# Patient Record
Sex: FEMALE | Race: WHITE | NOT HISPANIC OR LATINO
[De-identification: names, ages, dates, MRNs, and addresses within clinical notes are randomized per-mention and may not be internally consistent; named-entity substitution may affect disease eponyms.]

---

## 2017-04-05 ENCOUNTER — TRANSCRIPTION ENCOUNTER (OUTPATIENT)
Age: 52
End: 2017-04-05

## 2019-06-06 PROBLEM — Z00.00 ENCOUNTER FOR PREVENTIVE HEALTH EXAMINATION: Status: ACTIVE | Noted: 2019-06-06

## 2019-06-19 ENCOUNTER — APPOINTMENT (OUTPATIENT)
Dept: ORTHOPEDIC SURGERY | Facility: CLINIC | Age: 54
End: 2019-06-19
Payer: MEDICAID

## 2019-06-19 VITALS
DIASTOLIC BLOOD PRESSURE: 75 MMHG | HEIGHT: 68 IN | BODY MASS INDEX: 31.07 KG/M2 | WEIGHT: 205 LBS | SYSTOLIC BLOOD PRESSURE: 121 MMHG | HEART RATE: 56 BPM

## 2019-06-19 DIAGNOSIS — M21.6X2 OTHER ACQUIRED DEFORMITIES OF LEFT FOOT: ICD-10-CM

## 2019-06-19 DIAGNOSIS — M25.872 OTHER SPECIFIED JOINT DISORDERS, LEFT ANKLE AND FOOT: ICD-10-CM

## 2019-06-19 DIAGNOSIS — M79.672 PAIN IN LEFT FOOT: ICD-10-CM

## 2019-06-19 DIAGNOSIS — M21.42 FLAT FOOT [PES PLANUS] (ACQUIRED), LEFT FOOT: ICD-10-CM

## 2019-06-19 PROCEDURE — 73630 X-RAY EXAM OF FOOT: CPT | Mod: LT

## 2019-06-19 PROCEDURE — 99203 OFFICE O/P NEW LOW 30 MIN: CPT

## 2019-06-23 PROBLEM — M21.6X2 GASTROCNEMIUS EQUINUS OF LEFT LOWER EXTREMITY: Status: ACTIVE | Noted: 2019-06-23

## 2019-06-23 PROBLEM — M25.872 SUBFIBULAR IMPINGEMENT OF LEFT LOWER EXTREMITY: Status: ACTIVE | Noted: 2019-06-23

## 2019-06-25 ENCOUNTER — APPOINTMENT (OUTPATIENT)
Dept: SURGERY | Facility: CLINIC | Age: 54
End: 2019-06-25
Payer: MEDICAID

## 2019-06-25 VITALS
RESPIRATION RATE: 16 BRPM | OXYGEN SATURATION: 97 % | HEART RATE: 56 BPM | HEIGHT: 68 IN | TEMPERATURE: 98.5 F | DIASTOLIC BLOOD PRESSURE: 80 MMHG | SYSTOLIC BLOOD PRESSURE: 126 MMHG | WEIGHT: 205 LBS | BODY MASS INDEX: 31.07 KG/M2

## 2019-06-25 DIAGNOSIS — Z93.3 COLOSTOMY STATUS: ICD-10-CM

## 2019-06-25 DIAGNOSIS — Z82.49 FAMILY HISTORY OF ISCHEMIC HEART DISEASE AND OTHER DISEASES OF THE CIRCULATORY SYSTEM: ICD-10-CM

## 2019-06-25 DIAGNOSIS — Z87.19 PERSONAL HISTORY OF OTHER DISEASES OF THE DIGESTIVE SYSTEM: ICD-10-CM

## 2019-06-25 DIAGNOSIS — Z82.3 FAMILY HISTORY OF STROKE: ICD-10-CM

## 2019-06-25 DIAGNOSIS — Z78.9 OTHER SPECIFIED HEALTH STATUS: ICD-10-CM

## 2019-06-25 DIAGNOSIS — Z80.0 FAMILY HISTORY OF MALIGNANT NEOPLASM OF DIGESTIVE ORGANS: ICD-10-CM

## 2019-06-25 DIAGNOSIS — Z82.61 FAMILY HISTORY OF ARTHRITIS: ICD-10-CM

## 2019-06-25 PROCEDURE — 99204 OFFICE O/P NEW MOD 45 MIN: CPT

## 2019-06-25 RX ORDER — MULTIVITAMIN
TABLET ORAL
Refills: 0 | Status: ACTIVE | COMMUNITY

## 2019-06-25 RX ORDER — MULTIVIT-MIN/IRON/FOLIC ACID/K 18-600-40
CAPSULE ORAL
Refills: 0 | Status: ACTIVE | COMMUNITY

## 2019-06-25 RX ORDER — ZINC SULFATE 50(220)MG
CAPSULE ORAL
Refills: 0 | Status: ACTIVE | COMMUNITY

## 2019-06-25 RX ORDER — CETIRIZINE HCL 10 MG
TABLET ORAL
Refills: 0 | Status: ACTIVE | COMMUNITY

## 2019-06-25 RX ORDER — PNV NO.95/FERROUS FUM/FOLIC AC 28MG-0.8MG
TABLET ORAL
Refills: 0 | Status: ACTIVE | COMMUNITY

## 2019-06-25 RX ORDER — OMEGA-3/DHA/EPA/FISH OIL 300-1000MG
CAPSULE ORAL
Refills: 0 | Status: ACTIVE | COMMUNITY

## 2019-06-25 NOTE — ASSESSMENT
[FreeTextEntry1] : In summary the patient has a symptomatic lower midline multi-fenestrated reducible incisional hernia containing bowel. I recommended that this be electively repaired via a retrorectus approach. I ordered a CT of the abdomen and pelvis to better define her abdominal wall anatomy. I also will be obtaining her operative note pathology reports and her colonoscopy report. I explained the risks benefits and alternatives of surgery including the risks of bleeding infection and the likely need for mesh to repair her hernia

## 2019-06-25 NOTE — PHYSICAL EXAM
[Normal Breath Sounds] : Normal breath sounds [Normal Rate and Rhythm] : normal rate and rhythm [No HSM] : no hepatosplenomegaly [No Rash or Lesion] : No rash or lesion [Alert] : alert [Calm] : calm [de-identified] : Soft, nontender, multi-fenestrated reducible minimally tender lower midline incisional hernia containing bowel [de-identified] : nad [de-identified] : nl

## 2019-06-25 NOTE — HISTORY OF PRESENT ILLNESS
[de-identified] : The patient is a 54-year-old woman who underwent a Rani procedure in  for sigmoid diverticulitis. She underwent Rani reversal later that year. Her only other abdominal surgical history was a  section. Several years ago she noticed a lump at the lower aspect of her previous midline incision. This has slowly increased in size. She gets occasional discomfort in the area. She has lost 75 pounds over the past year. She states that she had a colonoscopy several years ago and was told that she had "ulcerative colitis". She did not followup with her gastroenterologist and takes no medications for ulcerative colitis. In the office today she feels well. She has mild discomfort in her lower abdomen. She denies nausea vomiting and moves her bowels daily.

## 2019-06-25 NOTE — CONSULT LETTER
[Dear  ___] : Dear  [unfilled], [Please see my note below.] : Please see my note below. [Consult Letter:] : I had the pleasure of evaluating your patient, [unfilled]. [Consult Closing:] : Thank you very much for allowing me to participate in the care of this patient.  If you have any questions, please do not hesitate to contact me. [Sincerely,] : Sincerely, [FreeTextEntry3] : Carlos Lucas M.D., F.A.C.S, F.A.S.C.R.S

## 2019-06-27 ENCOUNTER — TRANSCRIPTION ENCOUNTER (OUTPATIENT)
Age: 54
End: 2019-06-27

## 2019-07-07 PROBLEM — M21.42 ACQUIRED LEFT FLAT FOOT: Status: ACTIVE | Noted: 2019-06-23

## 2019-07-18 ENCOUNTER — CHART COPY (OUTPATIENT)
Age: 54
End: 2019-07-18

## 2019-07-31 ENCOUNTER — APPOINTMENT (OUTPATIENT)
Dept: ORTHOPEDIC SURGERY | Facility: CLINIC | Age: 54
End: 2019-07-31

## 2019-09-19 ENCOUNTER — OUTPATIENT (OUTPATIENT)
Dept: OUTPATIENT SERVICES | Facility: HOSPITAL | Age: 54
LOS: 1 days | End: 2019-09-19
Payer: MEDICAID

## 2019-09-19 VITALS
DIASTOLIC BLOOD PRESSURE: 68 MMHG | WEIGHT: 203.05 LBS | HEIGHT: 67 IN | SYSTOLIC BLOOD PRESSURE: 102 MMHG | HEART RATE: 61 BPM | RESPIRATION RATE: 16 BRPM | TEMPERATURE: 99 F | OXYGEN SATURATION: 99 %

## 2019-09-19 DIAGNOSIS — Z98.891 HISTORY OF UTERINE SCAR FROM PREVIOUS SURGERY: Chronic | ICD-10-CM

## 2019-09-19 DIAGNOSIS — K43.2 INCISIONAL HERNIA WITHOUT OBSTRUCTION OR GANGRENE: ICD-10-CM

## 2019-09-19 DIAGNOSIS — Z01.818 ENCOUNTER FOR OTHER PREPROCEDURAL EXAMINATION: ICD-10-CM

## 2019-09-19 DIAGNOSIS — Z29.9 ENCOUNTER FOR PROPHYLACTIC MEASURES, UNSPECIFIED: ICD-10-CM

## 2019-09-19 DIAGNOSIS — Z90.49 ACQUIRED ABSENCE OF OTHER SPECIFIED PARTS OF DIGESTIVE TRACT: Chronic | ICD-10-CM

## 2019-09-19 DIAGNOSIS — Z98.890 OTHER SPECIFIED POSTPROCEDURAL STATES: Chronic | ICD-10-CM

## 2019-09-19 DIAGNOSIS — Z90.89 ACQUIRED ABSENCE OF OTHER ORGANS: Chronic | ICD-10-CM

## 2019-09-19 LAB
ANION GAP SERPL CALC-SCNC: 10 MMOL/L — SIGNIFICANT CHANGE UP (ref 5–17)
BUN SERPL-MCNC: 19 MG/DL — SIGNIFICANT CHANGE UP (ref 7–23)
CALCIUM SERPL-MCNC: 10.2 MG/DL — SIGNIFICANT CHANGE UP (ref 8.4–10.5)
CHLORIDE SERPL-SCNC: 102 MMOL/L — SIGNIFICANT CHANGE UP (ref 96–108)
CO2 SERPL-SCNC: 29 MMOL/L — SIGNIFICANT CHANGE UP (ref 22–31)
CREAT SERPL-MCNC: 0.94 MG/DL — SIGNIFICANT CHANGE UP (ref 0.5–1.3)
GLUCOSE SERPL-MCNC: 87 MG/DL — SIGNIFICANT CHANGE UP (ref 70–99)
HCT VFR BLD CALC: 40.7 % — SIGNIFICANT CHANGE UP (ref 34.5–45)
HGB BLD-MCNC: 12.6 G/DL — SIGNIFICANT CHANGE UP (ref 11.5–15.5)
MCHC RBC-ENTMCNC: 28.1 PG — SIGNIFICANT CHANGE UP (ref 27–34)
MCHC RBC-ENTMCNC: 31 GM/DL — LOW (ref 32–36)
MCV RBC AUTO: 90.6 FL — SIGNIFICANT CHANGE UP (ref 80–100)
MRSA PCR RESULT.: SIGNIFICANT CHANGE UP
PLATELET # BLD AUTO: 260 K/UL — SIGNIFICANT CHANGE UP (ref 150–400)
POTASSIUM SERPL-MCNC: 4.5 MMOL/L — SIGNIFICANT CHANGE UP (ref 3.5–5.3)
POTASSIUM SERPL-SCNC: 4.5 MMOL/L — SIGNIFICANT CHANGE UP (ref 3.5–5.3)
RBC # BLD: 4.49 M/UL — SIGNIFICANT CHANGE UP (ref 3.8–5.2)
RBC # FLD: 14.5 % — SIGNIFICANT CHANGE UP (ref 10.3–14.5)
S AUREUS DNA NOSE QL NAA+PROBE: SIGNIFICANT CHANGE UP
SODIUM SERPL-SCNC: 141 MMOL/L — SIGNIFICANT CHANGE UP (ref 135–145)
WBC # BLD: 5.35 K/UL — SIGNIFICANT CHANGE UP (ref 3.8–10.5)
WBC # FLD AUTO: 5.35 K/UL — SIGNIFICANT CHANGE UP (ref 3.8–10.5)

## 2019-09-19 PROCEDURE — 85027 COMPLETE CBC AUTOMATED: CPT

## 2019-09-19 PROCEDURE — G0463: CPT

## 2019-09-19 PROCEDURE — 80048 BASIC METABOLIC PNL TOTAL CA: CPT

## 2019-09-19 PROCEDURE — 87641 MR-STAPH DNA AMP PROBE: CPT

## 2019-09-19 PROCEDURE — 87640 STAPH A DNA AMP PROBE: CPT

## 2019-09-19 NOTE — H&P PST ADULT - NSICDXPASTSURGICALHX_GEN_ALL_CORE_FT
PAST SURGICAL HISTORY:  H/O  section     History of colon resection 2006 - with creation of ostomy - for diverticulitis    History of colostomy reversal 2006    History of tonsillectomy

## 2019-09-19 NOTE — H&P PST ADULT - NSICDXPASTMEDICALHX_GEN_ALL_CORE_FT
PAST MEDICAL HISTORY:  Allergic bronchitis last episode 10 years ago    Diverticulitis     H/O ulcerative colitis     Incisional hernia

## 2019-09-19 NOTE — H&P PST ADULT - NSANTHOSAYNRD_GEN_A_CORE
No. ANT screening performed.  STOP BANG Legend: 0-2 = LOW Risk; 3-4 = INTERMEDIATE Risk; 5-8 = HIGH Risk

## 2019-09-19 NOTE — H&P PST ADULT - HISTORY OF PRESENT ILLNESS
53 yo female with h/o diverticulitis s/p colon resection and colostomy 2006 (with subsequent reversal of colostomy). Pt has developed large incisional hernia, with c/o occasional discomfort. She is scheduled for Incisional Hernia Repair on 9/30/2019.

## 2019-09-19 NOTE — H&P PST ADULT - NSICDXPROBLEM_GEN_ALL_CORE_FT
PROBLEM DIAGNOSES  Problem: Incisional hernia  Assessment and Plan: Incisional Hernia Repair  Nasal Swab sent at [facility]    Problem: Need for prophylactic measure  Assessment and Plan: The Caprini score indicates this patient is at risk for a VTE event (score 3-5).  Most surgical patients in this group would benefit from pharmacologic prophylaxis.  The surgical team will determine the balance between VTE risk and bleeding risk

## 2019-09-19 NOTE — H&P PST ADULT - ASSESSMENT
1.	[facility]  [facility] [[facility] updated 2019]    AGE RELATED RISK FACTORS                                                       MOBILITY RELATED FACTORS  [ x] Age 41-60 years                                            (1 Point)                    [ ] Bed rest                                                        (1 Point)  [ ] Age: 61-74 years                                           (2 Points)                  [ ] Plaster cast                                                   (2 Points)  [ ] Age= 75 years                                              (3 Points)                    [ ] Bed bound for more than 72 hours                 (2 Points)    DISEASE RELATED RISK FACTORS                                               GENDER SPECIFIC FACTORS  [ ] Edema in the lower extremities                       (1 Point)              [ ] Pregnancy                                                     (1 Point)  [ ] Varicose veins                                               (1 Point)                     [ ] Post-partum < 6 weeks                                   (1 Point)             [ x] BMI > 25 Kg/m2                                            (1 Point)                     [ ] Hormonal therapy  or oral contraception          (1 Point)                 [ ] Sepsis (in the previous month)                        (1 Point)               [ ] History of pregnancy complications                 (1 point)  [ ] Pneumonia or serious lung disease                                               [ ] Unexplained or recurrent                     (1 Point)           (in the previous month)                               (1 Point)  [ ] Abnormal pulmonary function test                     (1 Point)                 SURGERY RELATED RISK FACTORS  [ ] Acute myocardial infarction                              (1 Point)               [ ]  Section                                             (1 Point)  [ ] Congestive heart failure (in the previous month)  (1 Point)      [ ] Minor surgery                                                  (1 Point)   [ ] Inflammatory bowel disease                             (1 Point)               [ ] Arthroscopic surgery                                        (2 Points)  [ ] Central venous access                                      (2 Points)                [x ] General surgery lasting more than 45 minutes (2 points)  [ ] Malignancy- Present or previous                   (2 Points)                [ ] Elective arthroplasty                                         (5 points)    [ ] Stroke (in the previous month)                          (5 Points)                                                                                                                                                           HEMATOLOGY RELATED FACTORS                                                 TRAUMA RELATED RISK FACTORS  [ ] Prior episodes of VTE                                     (3 Points)                [ ] Fracture of the hip, pelvis, or leg                       (5 Points)  [ ] Positive family history for VTE                         (3 Points)             [ ] Acute spinal cord injury (in the previous month)  (5 Points)  [ ] Prothrombin 60350 A                                     (3 Points)               [ ] Paralysis  (less than 1 month)                             (5 Points)  [ ] Factor V Leiden                                             (3 Points)                  [ ] Multiple Trauma within 1 month                        (5 Points)  [ ] Lupus anticoagulants                                     (3 Points)                                                           [ ] Anticardiolipin antibodies                               (3 Points)                                                       [ ] High homocysteine in the blood                      (3 Points)                                             [ ] Other congenital or acquired thrombophilia      (3 Points)                                                [ ] Heparin induced thrombocytopenia                  (3 Points)                                     Total Score [     4     ]

## 2019-09-29 ENCOUNTER — TRANSCRIPTION ENCOUNTER (OUTPATIENT)
Age: 54
End: 2019-09-29

## 2019-09-29 NOTE — PRE-ANESTHESIA EVALUATION ADULT - NSANTHPMHFT_GEN_ALL_CORE
55 yo female with h/o diverticulitis s/p colon resection and colostomy 2006 (with subsequent reversal of colostomy). Pt has developed large incisional hernia, with c/o occasional discomfort. She is scheduled for Incisional Hernia Repair on 9/30/2019.

## 2019-09-30 ENCOUNTER — APPOINTMENT (OUTPATIENT)
Dept: SURGERY | Facility: HOSPITAL | Age: 54
End: 2019-09-30
Payer: MEDICAID

## 2019-09-30 ENCOUNTER — RESULT REVIEW (OUTPATIENT)
Age: 54
End: 2019-09-30

## 2019-09-30 ENCOUNTER — INPATIENT (INPATIENT)
Facility: HOSPITAL | Age: 54
LOS: 1 days | Discharge: HOME | DRG: 337 | End: 2019-10-02
Attending: COLON & RECTAL SURGERY | Admitting: COLON & RECTAL SURGERY
Payer: MEDICAID

## 2019-09-30 VITALS
TEMPERATURE: 98 F | RESPIRATION RATE: 16 BRPM | DIASTOLIC BLOOD PRESSURE: 73 MMHG | HEART RATE: 62 BPM | SYSTOLIC BLOOD PRESSURE: 106 MMHG | OXYGEN SATURATION: 99 % | HEIGHT: 67 IN | WEIGHT: 203.05 LBS

## 2019-09-30 DIAGNOSIS — Z98.890 OTHER SPECIFIED POSTPROCEDURAL STATES: Chronic | ICD-10-CM

## 2019-09-30 DIAGNOSIS — Z90.89 ACQUIRED ABSENCE OF OTHER ORGANS: Chronic | ICD-10-CM

## 2019-09-30 DIAGNOSIS — K43.2 INCISIONAL HERNIA WITHOUT OBSTRUCTION OR GANGRENE: ICD-10-CM

## 2019-09-30 DIAGNOSIS — Z98.891 HISTORY OF UTERINE SCAR FROM PREVIOUS SURGERY: Chronic | ICD-10-CM

## 2019-09-30 DIAGNOSIS — Z90.49 ACQUIRED ABSENCE OF OTHER SPECIFIED PARTS OF DIGESTIVE TRACT: Chronic | ICD-10-CM

## 2019-09-30 LAB
BLD GP AB SCN SERPL QL: NEGATIVE — SIGNIFICANT CHANGE UP
HCG UR QL: NEGATIVE — SIGNIFICANT CHANGE UP
RH IG SCN BLD-IMP: POSITIVE — SIGNIFICANT CHANGE UP

## 2019-09-30 PROCEDURE — 49568: CPT

## 2019-09-30 PROCEDURE — 15734 MUSCLE-SKIN GRAFT TRUNK: CPT | Mod: 59

## 2019-09-30 PROCEDURE — 49560: CPT

## 2019-09-30 PROCEDURE — 74018 RADEX ABDOMEN 1 VIEW: CPT | Mod: 26

## 2019-09-30 PROCEDURE — 88302 TISSUE EXAM BY PATHOLOGIST: CPT | Mod: 26

## 2019-09-30 RX ORDER — CHLORHEXIDINE GLUCONATE 213 G/1000ML
1 SOLUTION TOPICAL ONCE
Refills: 0 | Status: COMPLETED | OUTPATIENT
Start: 2019-09-30 | End: 2019-09-30

## 2019-09-30 RX ORDER — OXYCODONE HYDROCHLORIDE 5 MG/1
5 TABLET ORAL EVERY 4 HOURS
Refills: 0 | Status: DISCONTINUED | OUTPATIENT
Start: 2019-09-30 | End: 2019-10-02

## 2019-09-30 RX ORDER — SODIUM CHLORIDE 9 MG/ML
3 INJECTION INTRAMUSCULAR; INTRAVENOUS; SUBCUTANEOUS EVERY 8 HOURS
Refills: 0 | Status: DISCONTINUED | OUTPATIENT
Start: 2019-09-30 | End: 2019-09-30

## 2019-09-30 RX ORDER — OXYCODONE HYDROCHLORIDE 5 MG/1
10 TABLET ORAL EVERY 4 HOURS
Refills: 0 | Status: DISCONTINUED | OUTPATIENT
Start: 2019-09-30 | End: 2019-10-02

## 2019-09-30 RX ORDER — ACETAMINOPHEN 500 MG
975 TABLET ORAL EVERY 6 HOURS
Refills: 0 | Status: DISCONTINUED | OUTPATIENT
Start: 2019-09-30 | End: 2019-10-02

## 2019-09-30 RX ORDER — ONDANSETRON 8 MG/1
4 TABLET, FILM COATED ORAL ONCE
Refills: 0 | Status: DISCONTINUED | OUTPATIENT
Start: 2019-09-30 | End: 2019-09-30

## 2019-09-30 RX ORDER — PREGABALIN 225 MG/1
1000 CAPSULE ORAL DAILY
Refills: 0 | Status: DISCONTINUED | OUTPATIENT
Start: 2019-09-30 | End: 2019-10-02

## 2019-09-30 RX ORDER — ONDANSETRON 8 MG/1
4 TABLET, FILM COATED ORAL ONCE
Refills: 0 | Status: COMPLETED | OUTPATIENT
Start: 2019-09-30 | End: 2019-10-01

## 2019-09-30 RX ORDER — DIPHENHYDRAMINE HCL 50 MG
25 CAPSULE ORAL ONCE
Refills: 0 | Status: COMPLETED | OUTPATIENT
Start: 2019-09-30 | End: 2019-09-30

## 2019-09-30 RX ORDER — SODIUM CHLORIDE 9 MG/ML
1000 INJECTION, SOLUTION INTRAVENOUS
Refills: 0 | Status: DISCONTINUED | OUTPATIENT
Start: 2019-09-30 | End: 2019-10-01

## 2019-09-30 RX ORDER — LIDOCAINE HCL 20 MG/ML
0.2 VIAL (ML) INJECTION ONCE
Refills: 0 | Status: COMPLETED | OUTPATIENT
Start: 2019-09-30 | End: 2019-09-30

## 2019-09-30 RX ORDER — ASCORBIC ACID 60 MG
2000 TABLET,CHEWABLE ORAL DAILY
Refills: 0 | Status: DISCONTINUED | OUTPATIENT
Start: 2019-09-30 | End: 2019-10-02

## 2019-09-30 RX ORDER — CEFAZOLIN SODIUM 1 G
2000 VIAL (EA) INJECTION ONCE
Refills: 0 | Status: DISCONTINUED | OUTPATIENT
Start: 2019-09-30 | End: 2019-09-30

## 2019-09-30 RX ORDER — ENOXAPARIN SODIUM 100 MG/ML
40 INJECTION SUBCUTANEOUS EVERY 24 HOURS
Refills: 0 | Status: DISCONTINUED | OUTPATIENT
Start: 2019-09-30 | End: 2019-10-02

## 2019-09-30 RX ADMIN — Medication 1 TABLET(S): at 17:33

## 2019-09-30 RX ADMIN — SODIUM CHLORIDE 75 MILLILITER(S): 9 INJECTION, SOLUTION INTRAVENOUS at 13:14

## 2019-09-30 RX ADMIN — SODIUM CHLORIDE 75 MILLILITER(S): 9 INJECTION, SOLUTION INTRAVENOUS at 17:33

## 2019-09-30 RX ADMIN — Medication 25 MILLIGRAM(S): at 21:18

## 2019-09-30 RX ADMIN — Medication 0.2 MILLILITER(S): at 06:16

## 2019-09-30 RX ADMIN — ENOXAPARIN SODIUM 40 MILLIGRAM(S): 100 INJECTION SUBCUTANEOUS at 17:33

## 2019-09-30 RX ADMIN — CHLORHEXIDINE GLUCONATE 1 APPLICATION(S): 213 SOLUTION TOPICAL at 06:16

## 2019-09-30 RX ADMIN — Medication 975 MILLIGRAM(S): at 23:44

## 2019-09-30 RX ADMIN — Medication 975 MILLIGRAM(S): at 17:33

## 2019-09-30 RX ADMIN — Medication 2000 MILLIGRAM(S): at 17:33

## 2019-09-30 RX ADMIN — SODIUM CHLORIDE 3 MILLILITER(S): 9 INJECTION INTRAMUSCULAR; INTRAVENOUS; SUBCUTANEOUS at 06:16

## 2019-09-30 RX ADMIN — Medication 975 MILLIGRAM(S): at 18:00

## 2019-09-30 NOTE — BRIEF OPERATIVE NOTE - OPERATION/FINDINGS
Multiple fascial defects along previous incision and midline, closed with retrorectus mesh. Plastics consulted for skin closure, two krishna drains in subcutaneous space.

## 2019-09-30 NOTE — BRIEF OPERATIVE NOTE - NSICDXBRIEFPOSTOP_GEN_ALL_CORE_FT
POST-OP DIAGNOSIS:  Excess skin of abdomen 30-Sep-2019 12:40:08  Antonia Corea  Incisional hernia 30-Sep-2019 12:39:57  Antonia Corea

## 2019-09-30 NOTE — PRE-ANESTHESIA EVALUATION ADULT - NSANTHOSAYNRD_GEN_A_CORE
No. ANT screening performed.  STOP BANG Legend: 0-2 = LOW Risk; 3-4 = INTERMEDIATE Risk; 5-8 = HIGH Risk
No. ANT screening performed.  STOP BANG Legend: 0-2 = LOW Risk; 3-4 = INTERMEDIATE Risk; 5-8 = HIGH Risk

## 2019-09-30 NOTE — PRE-ANESTHESIA EVALUATION ADULT - ANESTHESIA, PREVIOUS REACTION, PROFILE
no personal reaction to anesthesia; mother had N/V with anesthesia/none
no personal reaction to anesthesia; mother had N/V with anesthesia/none

## 2019-09-30 NOTE — CHART NOTE - NSCHARTNOTEFT_GEN_A_CORE
Surgery Post op Note    Procedure: Incisional Hernia Repair with Mesh  POD #0  Surgeon: Dr. Lucas     SUBJECTIVE: Patient seen and evaluated at the bedside. Patient with daughter and other family members at the bedside. Doing well. Recently transferred from the PACU to the surgical floor. Pain well tolerated with medications. Prevena vac in place.   SOB:  [ ] YES [x ] NO  Chest Discomfort: [ ] YES [x ] NO    Nausea: [ ] YES [x ] NO           Vomiting: [ ] YES [x ] NO  Flatus: [ ] YES [x ] NO             Bowel Movement: [ ] YES [x ] NO  Void: [x ]YES [ ]No    Brizuela Catheter in place      Pain Control Adequate: [x ] YES [ ] NO    Objective:   Vital Signs Last 24 Hrs  T(C): 36.3 (30 Sep 2019 16:45), Max: 36.7 (30 Sep 2019 15:46)  T(F): 97.3 (30 Sep 2019 16:45), Max: 98.1 (30 Sep 2019 15:46)  HR: 61 (30 Sep 2019 16:45) (54 - 73)  BP: 93/60 (30 Sep 2019 16:45) (93/60 - 111/69)  BP(mean): 73 (30 Sep 2019 14:30) (70 - 85)  RR: 18 (30 Sep 2019 16:45) (11 - 18)  SpO2: 98% (30 Sep 2019 16:45) (96% - 99%)  I&O's Summary    30 Sep 2019 07:01  -  30 Sep 2019 16:57  --------------------------------------------------------  IN: 345 mL / OUT: 225 mL / NET: 120 mL      I&O's Detail    30 Sep 2019 07:01  -  30 Sep 2019 16:57  --------------------------------------------------------  IN:    lactated ringers.: 225 mL    Oral Fluid: 120 mL  Total IN: 345 mL    OUT:    Bulb: 20 mL    Bulb: 20 mL    Indwelling Catheter - Urethral: 185 mL  Total OUT: 225 mL    Total NET: 120 mL            LABS:                MEDICATIONS  (STANDING):  acetaminophen   Tablet .. 975 milliGRAM(s) Oral every 6 hours  ascorbic acid 2000 milliGRAM(s) Oral daily  cyanocobalamin 1000 MICROGram(s) Oral daily  enoxaparin Injectable 40 milliGRAM(s) SubCutaneous every 24 hours  lactated ringers. 1000 milliLiter(s) (75 mL/Hr) IV Continuous <Continuous>  multivitamin 1 Tablet(s) Oral daily    MEDICATIONS  (PRN):  oxyCODONE    IR 5 milliGRAM(s) Oral every 4 hours PRN Moderate Pain (4 - 6)  oxyCODONE    IR 10 milliGRAM(s) Oral every 4 hours PRN Severe Pain (7 - 10)      Physical exam  General  Abdomen     Assessment/Plan: 54y Female  s/p   - Diet:   - Activity- OOB with assistance  - Labs:   - Pain medication as needed   - DVT ppx  - incentive spirometry     Isabel Culver PA-C   p 90 Surgery Post op Note    Procedure: Incisional Hernia Repair with Mesh  POD #0  Surgeon: Dr. Lucas     SUBJECTIVE: Patient seen and evaluated at the bedside. Patient with daughter and other family members at the bedside. Doing well. Recently transferred from the PACU to the surgical floor. Pain well tolerated with medications. Prevena vac in place.   SOB:  [ ] YES [x ] NO  Chest Discomfort: [ ] YES [x ] NO    Nausea: [ ] YES [x ] NO           Vomiting: [ ] YES [x ] NO  Flatus: [ ] YES [x ] NO             Bowel Movement: [ ] YES [x ] NO  Void: [x ]YES [ ]No    Brizuela Catheter in place      Pain Control Adequate: [x ] YES [ ] NO    Objective:   Vital Signs Last 24 Hrs  T(C): 36.3 (30 Sep 2019 16:45), Max: 36.7 (30 Sep 2019 15:46)  T(F): 97.3 (30 Sep 2019 16:45), Max: 98.1 (30 Sep 2019 15:46)  HR: 61 (30 Sep 2019 16:45) (54 - 73)  BP: 93/60 (30 Sep 2019 16:45) (93/60 - 111/69)  BP(mean): 73 (30 Sep 2019 14:30) (70 - 85)  RR: 18 (30 Sep 2019 16:45) (11 - 18)  SpO2: 98% (30 Sep 2019 16:45) (96% - 99%)  I&O's Summary    30 Sep 2019 07:01  -  30 Sep 2019 16:57  --------------------------------------------------------  IN: 345 mL / OUT: 225 mL / NET: 120 mL      I&O's Detail    30 Sep 2019 07:01  -  30 Sep 2019 16:57  --------------------------------------------------------  IN:    lactated ringers.: 225 mL    Oral Fluid: 120 mL  Total IN: 345 mL    OUT:    Bulb: 20 mL    Bulb: 20 mL    Indwelling Catheter - Urethral: 185 mL  Total OUT: 225 mL    Total NET: 120 mL            LABS:                MEDICATIONS  (STANDING):  acetaminophen   Tablet .. 975 milliGRAM(s) Oral every 6 hours  ascorbic acid 2000 milliGRAM(s) Oral daily  cyanocobalamin 1000 MICROGram(s) Oral daily  enoxaparin Injectable 40 milliGRAM(s) SubCutaneous every 24 hours  lactated ringers. 1000 milliLiter(s) (75 mL/Hr) IV Continuous <Continuous>  multivitamin 1 Tablet(s) Oral daily    MEDICATIONS  (PRN):  oxyCODONE    IR 5 milliGRAM(s) Oral every 4 hours PRN Moderate Pain (4 - 6)  oxyCODONE    IR 10 milliGRAM(s) Oral every 4 hours PRN Severe Pain (7 - 10)      Physical exam  General: NAD, AAO x 3  Respiratory: non labored breathing on room, attached to continuous pulse ox spO2 97-99%  Abdomen: soft, appropriately tender to palpation near incision, nondistended     Assessment/Plan: 54y Female  s/p   - Diet:   - Activity- OOB with assistance  - Labs:   - Pain medication as needed   - DVT ppx  - incentive spirometry     Isabel Culver PA-C   p 90 Surgery Post op Note    Procedure: Incisional Hernia Repair with Mesh  POD #0  Surgeon: Dr. Lucas     SUBJECTIVE: Patient seen and evaluated at the bedside. Patient with daughter and other family members at the bedside. Doing well. Recently transferred from the PACU to the surgical floor. Pain well tolerated with medications. Prevena vac in place. Pulling [phone] on IS.   SOB:  [ ] YES [x ] NO  Chest Discomfort: [ ] YES [x ] NO    Nausea: [ ] YES [x ] NO           Vomiting: [ ] YES [x ] NO  Flatus: [ ] YES [x ] NO             Bowel Movement: [ ] YES [x ] NO  Void: [x ]YES [ ]No    Rodriguez Catheter in place      Pain Control Adequate: [x ] YES [ ] NO    Objective:   Vital Signs Last 24 Hrs  T(C): 36.3 (30 Sep 2019 16:45), Max: 36.7 (30 Sep 2019 15:46)  T(F): 97.3 (30 Sep 2019 16:45), Max: 98.1 (30 Sep 2019 15:46)  HR: 61 (30 Sep 2019 16:45) (54 - 73)  BP: 93/60 (30 Sep 2019 16:45) (93/60 - 111/69)  BP(mean): 73 (30 Sep 2019 14:30) (70 - 85)  RR: 18 (30 Sep 2019 16:45) (11 - 18)  SpO2: 98% (30 Sep 2019 16:45) (96% - 99%)  I&O's Summary    30 Sep 2019 07:01  -  30 Sep 2019 16:57  --------------------------------------------------------  IN: 345 mL / OUT: 225 mL / NET: 120 mL      I&O's Detail    30 Sep 2019 07:01  -  30 Sep 2019 16:57  --------------------------------------------------------  IN:    lactated ringers.: 225 mL    Oral Fluid: 120 mL  Total IN: 345 mL    OUT:    Bulb: 20 mL    Bulb: 20 mL    Indwelling Catheter - Urethral: 185 mL  Total OUT: 225 mL    Total NET: 120 mL            LABS:                MEDICATIONS  (STANDING):  acetaminophen   Tablet .. 975 milliGRAM(s) Oral every 6 hours  ascorbic acid 2000 milliGRAM(s) Oral daily  cyanocobalamin 1000 MICROGram(s) Oral daily  enoxaparin Injectable 40 milliGRAM(s) SubCutaneous every 24 hours  lactated ringers. 1000 milliLiter(s) (75 mL/Hr) IV Continuous <Continuous>  multivitamin 1 Tablet(s) Oral daily    MEDICATIONS  (PRN):  oxyCODONE    IR 5 milliGRAM(s) Oral every 4 hours PRN Moderate Pain (4 - 6)  oxyCODONE    IR 10 milliGRAM(s) Oral every 4 hours PRN Severe Pain (7 - 10)      Physical exam  General: NAD, AAO x 3  Respiratory: non labored breathing on room, attached to continuous pulse ox spO2 97-99%  Abdomen: soft, appropriately tender to palpation near incision, nondistended     Assessment/Plan: 54y Female with a h/o diverticulitis s/p colon resection and colostomy in 2006 with subsequent reversal of colostomy now PODS#0 from an incisional hernia repair with mesh.   - Diet: Regular diet   - Activity- OOB as tolerated  - Labs: AM labs  - Pain medication as needed   - DVT ppx  - incentive spirometry   - c/w rodriguez catheter     Isabel Culver PA-C   p [id number]

## 2019-10-01 LAB
ANION GAP SERPL CALC-SCNC: 8 MMOL/L — SIGNIFICANT CHANGE UP (ref 5–17)
BUN SERPL-MCNC: 13 MG/DL — SIGNIFICANT CHANGE UP (ref 7–23)
CALCIUM SERPL-MCNC: 9 MG/DL — SIGNIFICANT CHANGE UP (ref 8.4–10.5)
CHLORIDE SERPL-SCNC: 102 MMOL/L — SIGNIFICANT CHANGE UP (ref 96–108)
CO2 SERPL-SCNC: 28 MMOL/L — SIGNIFICANT CHANGE UP (ref 22–31)
CREAT SERPL-MCNC: 0.81 MG/DL — SIGNIFICANT CHANGE UP (ref 0.5–1.3)
GLUCOSE SERPL-MCNC: 103 MG/DL — HIGH (ref 70–99)
HCT VFR BLD CALC: 36.9 % — SIGNIFICANT CHANGE UP (ref 34.5–45)
HGB BLD-MCNC: 11.2 G/DL — LOW (ref 11.5–15.5)
MAGNESIUM SERPL-MCNC: 2.2 MG/DL — SIGNIFICANT CHANGE UP (ref 1.6–2.6)
MCHC RBC-ENTMCNC: 27.7 PG — SIGNIFICANT CHANGE UP (ref 27–34)
MCHC RBC-ENTMCNC: 30.4 GM/DL — LOW (ref 32–36)
MCV RBC AUTO: 91.3 FL — SIGNIFICANT CHANGE UP (ref 80–100)
PHOSPHATE SERPL-MCNC: 2.8 MG/DL — SIGNIFICANT CHANGE UP (ref 2.5–4.5)
PLATELET # BLD AUTO: 257 K/UL — SIGNIFICANT CHANGE UP (ref 150–400)
POTASSIUM SERPL-MCNC: 4.1 MMOL/L — SIGNIFICANT CHANGE UP (ref 3.5–5.3)
POTASSIUM SERPL-SCNC: 4.1 MMOL/L — SIGNIFICANT CHANGE UP (ref 3.5–5.3)
RBC # BLD: 4.04 M/UL — SIGNIFICANT CHANGE UP (ref 3.8–5.2)
RBC # FLD: 14.3 % — SIGNIFICANT CHANGE UP (ref 10.3–14.5)
SODIUM SERPL-SCNC: 138 MMOL/L — SIGNIFICANT CHANGE UP (ref 135–145)
WBC # BLD: 10.47 K/UL — SIGNIFICANT CHANGE UP (ref 3.8–10.5)
WBC # FLD AUTO: 10.47 K/UL — SIGNIFICANT CHANGE UP (ref 3.8–10.5)

## 2019-10-01 RX ORDER — LORATADINE 10 MG/1
10 TABLET ORAL DAILY
Refills: 0 | Status: DISCONTINUED | OUTPATIENT
Start: 2019-10-01 | End: 2019-10-02

## 2019-10-01 RX ADMIN — Medication 975 MILLIGRAM(S): at 06:33

## 2019-10-01 RX ADMIN — Medication 975 MILLIGRAM(S): at 12:42

## 2019-10-01 RX ADMIN — Medication 975 MILLIGRAM(S): at 23:22

## 2019-10-01 RX ADMIN — Medication 1 TABLET(S): at 12:24

## 2019-10-01 RX ADMIN — ONDANSETRON 4 MILLIGRAM(S): 8 TABLET, FILM COATED ORAL at 20:22

## 2019-10-01 RX ADMIN — OXYCODONE HYDROCHLORIDE 5 MILLIGRAM(S): 5 TABLET ORAL at 19:10

## 2019-10-01 RX ADMIN — Medication 2000 MILLIGRAM(S): at 12:44

## 2019-10-01 RX ADMIN — Medication 975 MILLIGRAM(S): at 18:11

## 2019-10-01 RX ADMIN — Medication 975 MILLIGRAM(S): at 18:15

## 2019-10-01 RX ADMIN — Medication 975 MILLIGRAM(S): at 00:08

## 2019-10-01 RX ADMIN — PREGABALIN 1000 MICROGRAM(S): 225 CAPSULE ORAL at 12:24

## 2019-10-01 RX ADMIN — OXYCODONE HYDROCHLORIDE 5 MILLIGRAM(S): 5 TABLET ORAL at 19:42

## 2019-10-01 RX ADMIN — Medication 975 MILLIGRAM(S): at 12:24

## 2019-10-01 RX ADMIN — Medication 975 MILLIGRAM(S): at 06:03

## 2019-10-01 RX ADMIN — ENOXAPARIN SODIUM 40 MILLIGRAM(S): 100 INJECTION SUBCUTANEOUS at 18:12

## 2019-10-01 NOTE — PROGRESS NOTE ADULT - SUBJECTIVE AND OBJECTIVE BOX
Day 1 of Anesthesia Pain Management Service    SUBJECTIVE:  Pain Scale Score:          [X] Refer to charted pain scores    THERAPY:    s/p 300mcg PF morphine on 9\30 0730      MEDICATIONS  (STANDING):  acetaminophen   Tablet .. 975 milliGRAM(s) Oral every 6 hours  ascorbic acid 2000 milliGRAM(s) Oral daily  cyanocobalamin 1000 MICROGram(s) Oral daily  enoxaparin Injectable 40 milliGRAM(s) SubCutaneous every 24 hours  multivitamin 1 Tablet(s) Oral daily  ondansetron    Tablet 4 milliGRAM(s) Oral once    MEDICATIONS  (PRN):  oxyCODONE    IR 5 milliGRAM(s) Oral every 4 hours PRN Moderate Pain (4 - 6)  oxyCODONE    IR 10 milliGRAM(s) Oral every 4 hours PRN Severe Pain (7 - 10)      OBJECTIVE:    Sedation:        	[X] Alert	 [ ] Drowsy	[ ] Arousable      [ ] Asleep       [ ] Unresponsive    Side Effects:	[X] None 	[ ] Nausea	[ ] Vomiting         [ ] Pruritus  		[ ] Weakness            [ ] Numbness	          [ ] Other:    Vital Signs Last 24 Hrs  T(C): 36.8 (01 Oct 2019 05:29), Max: 36.9 (30 Sep 2019 22:10)  T(F): 98.3 (01 Oct 2019 05:29), Max: 98.4 (30 Sep 2019 22:10)  HR: 55 (01 Oct 2019 05:29) (52 - 73)  BP: 93/60 (01 Oct 2019 05:29) (93/60 - 111/69)  BP(mean): 73 (30 Sep 2019 14:30) (70 - 85)  RR: 18 (01 Oct 2019 05:29) (11 - 18)  SpO2: 97% (01 Oct 2019 05:29) (96% - 99%)    ASSESSMENT/ PLAN  [X] Patient transitioned to prn analgesics  [X] Pain management per primary service, pain service to sign off   [X]Documentation and Verification of current medications

## 2019-10-01 NOTE — PROGRESS NOTE ADULT - SUBJECTIVE AND OBJECTIVE BOX
SUBJECTIVE: Pt seen and examined at bedside with chief resident.  POD1 s/p incisional hernia repair with mesh with PRS closure  Patient is doing well- denies nausea, vomiting, chest pain or SOB      MEDICATIONS  (STANDING):  acetaminophen   Tablet .. 975 milliGRAM(s) Oral every 6 hours  ascorbic acid 2000 milliGRAM(s) Oral daily  cyanocobalamin 1000 MICROGram(s) Oral daily  enoxaparin Injectable 40 milliGRAM(s) SubCutaneous every 24 hours  lactated ringers. 1000 milliLiter(s) (75 mL/Hr) IV Continuous <Continuous>  multivitamin 1 Tablet(s) Oral daily  ondansetron    Tablet 4 milliGRAM(s) Oral once    MEDICATIONS  (PRN):  oxyCODONE    IR 5 milliGRAM(s) Oral every 4 hours PRN Moderate Pain (4 - 6)  oxyCODONE    IR 10 milliGRAM(s) Oral every 4 hours PRN Severe Pain (7 - 10)      OBJECTIVE:    Vital Signs Last 24 Hrs  T(C): 36.9 (30 Sep 2019 22:10), Max: 36.9 (30 Sep 2019 22:10)  T(F): 98.4 (30 Sep 2019 22:10), Max: 98.4 (30 Sep 2019 22:10)  HR: 59 (30 Sep 2019 22:10) (52 - 73)  BP: 105/66 (30 Sep 2019 22:10) (93/60 - 111/69)  BP(mean): 73 (30 Sep 2019 14:30) (70 - 85)  RR: 18 (30 Sep 2019 22:10) (11 - 18)  SpO2: 97% (30 Sep 2019 22:10) (96% - 99%)    General Appearance: Resting comfortably, no acute distress  Neck: Supple  Chest: non-labored breathing, no respiratory distress  CV: Pulse regular presently  Abdomen: Soft, non-tender, non-distended  Extremities: warm and well perfused    I&O's Summary    30 Sep 2019 07:01  -  01 Oct 2019 04:26  --------------------------------------------------------  IN: 495 mL / OUT: 690 mL / NET: -195 mL      I&O's Detail    30 Sep 2019 07:01  -  01 Oct 2019 04:26  --------------------------------------------------------  IN:    lactated ringers.: 375 mL    Oral Fluid: 120 mL  Total IN: 495 mL    OUT:    Bulb: 40 mL    Bulb: 40 mL    Indwelling Catheter - Urethral: 610 mL  Total OUT: 690 mL    Total NET: -195 mL            LABS:                RADIOLOGY & ADDITIONAL STUDIES: SUBJECTIVE: Pt seen and examined at bedside with chief resident.  POD1 s/p incisional hernia repair with mesh with PRS closure  Patient is doing well- denies chest pain or SOB  Vomiting x2 after eating overnight      MEDICATIONS  (STANDING):  acetaminophen   Tablet .. 975 milliGRAM(s) Oral every 6 hours  ascorbic acid 2000 milliGRAM(s) Oral daily  cyanocobalamin 1000 MICROGram(s) Oral daily  enoxaparin Injectable 40 milliGRAM(s) SubCutaneous every 24 hours  lactated ringers. 1000 milliLiter(s) (75 mL/Hr) IV Continuous <Continuous>  multivitamin 1 Tablet(s) Oral daily  ondansetron    Tablet 4 milliGRAM(s) Oral once    MEDICATIONS  (PRN):  oxyCODONE    IR 5 milliGRAM(s) Oral every 4 hours PRN Moderate Pain (4 - 6)  oxyCODONE    IR 10 milliGRAM(s) Oral every 4 hours PRN Severe Pain (7 - 10)      OBJECTIVE:    Vital Signs Last 24 Hrs  T(C): 36.9 (30 Sep 2019 22:10), Max: 36.9 (30 Sep 2019 22:10)  T(F): 98.4 (30 Sep 2019 22:10), Max: 98.4 (30 Sep 2019 22:10)  HR: 59 (30 Sep 2019 22:10) (52 - 73)  BP: 105/66 (30 Sep 2019 22:10) (93/60 - 111/69)  BP(mean): 73 (30 Sep 2019 14:30) (70 - 85)  RR: 18 (30 Sep 2019 22:10) (11 - 18)  SpO2: 97% (30 Sep 2019 22:10) (96% - 99%)    General Appearance: Resting comfortably, no acute distress  Neck: Supple  Chest: non-labored breathing, no respiratory distress  CV: Pulse regular presently  Abdomen: Soft, non-tender, non-distended  Extremities: warm and well perfused    I&O's Summary    30 Sep 2019 07:01  -  01 Oct 2019 04:26  --------------------------------------------------------  IN: 495 mL / OUT: 690 mL / NET: -195 mL      I&O's Detail    30 Sep 2019 07:01  -  01 Oct 2019 04:26  --------------------------------------------------------  IN:    lactated ringers.: 375 mL    Oral Fluid: 120 mL  Total IN: 495 mL    OUT:    Bulb: 40 mL    Bulb: 40 mL    Indwelling Catheter - Urethral: 610 mL  Total OUT: 690 mL    Total NET: -195 mL            LABS:                RADIOLOGY & ADDITIONAL STUDIES:

## 2019-10-01 NOTE — PROGRESS NOTE ADULT - ASSESSMENT
54 year old female with history of diverticulitis, colostomy creation and colostomy reversal presented for repair of incisional hernia. Now POD1, recovering well    - Continue regular diet  - discontinue rodriguez  - pain control as needed  - Prevena in place until follow up on Friday 10/4    [facility], [id number]

## 2019-10-01 NOTE — PROGRESS NOTE ADULT - ATTENDING COMMENTS
I have seen and examined the patient. I agree with the above surgery resident's note.  feel sbetter, nausea resolved  ambulate, diet as rpabhu

## 2019-10-02 ENCOUNTER — TRANSCRIPTION ENCOUNTER (OUTPATIENT)
Age: 54
End: 2019-10-02

## 2019-10-02 VITALS
RESPIRATION RATE: 95 BRPM | OXYGEN SATURATION: 95 % | DIASTOLIC BLOOD PRESSURE: 80 MMHG | HEART RATE: 74 BPM | SYSTOLIC BLOOD PRESSURE: 120 MMHG | TEMPERATURE: 99 F

## 2019-10-02 PROBLEM — Z87.19 PERSONAL HISTORY OF OTHER DISEASES OF THE DIGESTIVE SYSTEM: Chronic | Status: ACTIVE | Noted: 2019-09-19

## 2019-10-02 PROBLEM — K43.2 INCISIONAL HERNIA WITHOUT OBSTRUCTION OR GANGRENE: Chronic | Status: ACTIVE | Noted: 2019-09-19

## 2019-10-02 PROBLEM — J45.909 UNSPECIFIED ASTHMA, UNCOMPLICATED: Chronic | Status: ACTIVE | Noted: 2019-09-19

## 2019-10-02 PROBLEM — K57.92 DIVERTICULITIS OF INTESTINE, PART UNSPECIFIED, WITHOUT PERFORATION OR ABSCESS WITHOUT BLEEDING: Chronic | Status: ACTIVE | Noted: 2019-09-19

## 2019-10-02 RX ORDER — OXYCODONE HYDROCHLORIDE 5 MG/1
1 TABLET ORAL
Qty: 12 | Refills: 0
Start: 2019-10-02 | End: 2019-10-05

## 2019-10-02 RX ORDER — OXYCODONE HYDROCHLORIDE 5 MG/1
1 TABLET ORAL
Qty: 0 | Refills: 0 | DISCHARGE
Start: 2019-10-02

## 2019-10-02 RX ORDER — ACETAMINOPHEN 500 MG
3 TABLET ORAL
Qty: 0 | Refills: 0 | DISCHARGE
Start: 2019-10-02

## 2019-10-02 RX ADMIN — Medication 975 MILLIGRAM(S): at 05:16

## 2019-10-02 RX ADMIN — PREGABALIN 1000 MICROGRAM(S): 225 CAPSULE ORAL at 12:20

## 2019-10-02 RX ADMIN — Medication 1 TABLET(S): at 12:19

## 2019-10-02 RX ADMIN — Medication 975 MILLIGRAM(S): at 14:50

## 2019-10-02 RX ADMIN — Medication 975 MILLIGRAM(S): at 06:06

## 2019-10-02 RX ADMIN — Medication 2000 MILLIGRAM(S): at 12:19

## 2019-10-02 RX ADMIN — Medication 975 MILLIGRAM(S): at 00:00

## 2019-10-02 RX ADMIN — Medication 975 MILLIGRAM(S): at 12:19

## 2019-10-02 NOTE — PROGRESS NOTE ADULT - ASSESSMENT
54 year old female with history of diverticulitis, colostomy creation and colostomy reversal presented for repair of incisional hernia. Now POD1, recovering well    - Continue regular diet  - discontinue rodriguez  - pain control as needed  - Prevena in place until follow up on Friday 10/4    [facility], [id number] 54 year old female with history of diverticulitis, colostomy creation and colostomy reversal presented for repair of incisional hernia. Now POD2, recovering well    - Continue regular diet  - discontinue rodriguez  - pain control as needed  - Prevena in place until follow up on Friday 10/4    [facility], [id number]

## 2019-10-02 NOTE — DISCHARGE NOTE NURSING/CASE MANAGEMENT/SOCIAL WORK - PATIENT PORTAL LINK FT
You can access the FollowMyHealth Patient Portal offered by [facility] by registering at the following website: [email or web address]. By joining [facility]’s FollowMyHealth portal, you will also be able to view your health information using other applications (apps) compatible with our system.

## 2019-10-02 NOTE — PROGRESS NOTE ADULT - SUBJECTIVE AND OBJECTIVE BOX
SUBJECTIVE: Pt seen and examined at bedside with chief resident.  POD2 s/p incisional hernia repair with mesh with PRS closure  No more vomiting since last night, denies nausea  Denies chest pain, SOB    acetaminophen   Tablet .. 975 milliGRAM(s) every 6 hours  ascorbic acid 2000 milliGRAM(s) daily  cyanocobalamin 1000 MICROGram(s) daily  enoxaparin Injectable 40 milliGRAM(s) every 24 hours  loratadine 10 milliGRAM(s) daily  multivitamin 1 Tablet(s) daily  oxyCODONE    IR 5 milliGRAM(s) every 4 hours PRN  oxyCODONE    IR 10 milliGRAM(s) every 4 hours PRN      OBJECTIVE:    Vital Signs Last 24 Hrs  T(C): 36.9 (30 Sep 2019 22:10), Max: 36.9 (30 Sep 2019 22:10)  T(F): 98.4 (30 Sep 2019 22:10), Max: 98.4 (30 Sep 2019 22:10)  HR: 59 (30 Sep 2019 22:10) (52 - 73)  BP: 105/66 (30 Sep 2019 22:10) (93/60 - 111/69)  BP(mean): 73 (30 Sep 2019 14:30) (70 - 85)  RR: 18 (30 Sep 2019 22:10) (11 - 18)  SpO2: 97% (30 Sep 2019 22:10) (96% - 99%)    General Appearance: Resting comfortably, no acute distress  Neck: Supple  Chest: non-labored breathing, no respiratory distress  CV: Pulse regular presently  Abdomen: Soft, non-tender, non-distended, prevena in place holding suction, ADOLFO drains SS  Extremities: warm and well perfused              LABS:               11.2   10.47 )-----------( 257      ( 01 Oct 2019 09:12 )             36.9     10-01    138  |  102  |  13  ----------------------------<  103<H>  4.1   |  28  |  0.81    Ca    9.0      01 Oct 2019 07:07  Phos  2.8     10-01  Mg     2.2     10-01            RADIOLOGY & ADDITIONAL STUDIES:

## 2019-10-02 NOTE — DISCHARGE NOTE PROVIDER - HOSPITAL COURSE
55 yo female with h/o diverticulitis s/p colon resection and colostomy 2006 (with subsequent reversal of colostomy). Pt has developed large incisional hernia, with c/o occasional discomfort. She is scheduled for Incisional Hernia Repair on 9/30/2019.        Pt underwent Multiple fascial defects along previous incision and midline, closed with retrorectus mesh. Plastics consulted for skin closure, two krishna drains in subcutaneous space.  Pt. tolerated procedure well and was transferred to the recovery room and then the floor without incidence.  Pt. was mainly managed for postoperative pain, wound care, as well as close monitoring of fluid resuscitation and return of GI function.  Pt has been tolerating a diet, voiding, ambulating, and the pain is now well controlled.   Pt. is ready for discharge home in stable condition, and will follow up within one to two weeks as an outpatient with Dr. Lucas and  Dr. Tse, Plastics 53 yo female with h/o diverticulitis s/p colon resection and colostomy 2006 (with subsequent reversal of colostomy). Pt has developed large incisional hernia, with c/o occasional discomfort. She is scheduled for Incisional Hernia Repair on 9/30/2019.        Pt underwent incisional hernia repair with mesh, multiple fascial defects along previous incision and midline, closed with retrorectus mesh. Plastics consulted for skin closure, two krishna drains in subcutaneous space.  Pt. tolerated procedure well and was transferred to the recovery room and then the floor without incidence.  Pt. was mainly managed for postoperative pain, wound care, as well as close monitoring of fluid resuscitation and return of GI function.  Pt has been tolerating a diet, voiding, ambulating, and the pain is now well controlled.  Pt. is ready for discharge home in stable condition, and will follow up within one to two weeks as an outpatient with Dr. Lucas and  Dr. Tse, Plastics. Her Perveena VAC and JPs will be followed up as an outpatient.

## 2019-10-02 NOTE — DISCHARGE NOTE PROVIDER - NSDCCPCAREPLAN_GEN_ALL_CORE_FT
PRINCIPAL DISCHARGE DIAGNOSIS  Diagnosis: Incisional hernia  Assessment and Plan of Treatment: Follow up with Dr. Lucas in 7 to 10 days.  Call [phone] for appointment.  Plastic Surgery, Dr. Brittany Tse, Call their office  [phone]  for appointment.  You will be discharged with ADOLFO drain. You will need to empty at least once daily and record the outputs accurately. This will be taught to you by the nursing staff.   Please do not remove the ADOLFO drain.  Please empty and record output daily, and discard contents.  Please bring recorded results to your follow up appointment.  They will be removed in the office when clinically indicated.   Please bring to the office, the recorded results of the daily recorded output.  You will be discharged with the Perveena VAC which will be removed at the office.  Notify your physician/surgeon and return to ER for temperatures greater than 101, chills sweats, pain not controlled with pain medications, persistent nausea and vomiting, or acutely concerning matters to you, that may require urgent medical attention. PRINCIPAL DISCHARGE DIAGNOSIS  Diagnosis: Incisional hernia  Assessment and Plan of Treatment: Follow up with Dr. Lucas in 7 to 10 days.  Call [phone] for appointment.  Plastic Surgery, Dr. Frankie Tse, Call their office  [phone]  for appointment.  You will be discharged with ADOLFO drain. You will need to empty at least once daily and record the outputs accurately. This will be taught to you by the nursing staff.   Please do not remove the ADOLFO drain.  Please empty and record output daily, and discard contents.  Please bring recorded results to your follow up appointment.  They will be removed in the office when clinically indicated.   Please bring to the office, the recorded results of the daily recorded output.  You will be discharged with the Perveena VAC which will be removed at the office at your follow up appointmetn.  please keep area clean and dry.  Notify your physician/surgeon and return to ER for temperatures greater than 101, chills sweats, pain not controlled with pain medications, persistent nausea and vomiting, or acutely concerning matters to you, that may require urgent medical attention. PRINCIPAL DISCHARGE DIAGNOSIS  Diagnosis: Incisional hernia  Assessment and Plan of Treatment: Follow up with Dr. Lucas on Friday 10/4.  Call [phone] for appointment.  Plastic Surgery, Dr. Frankie Tse, Call their office  [phone]  for appointment.  You will be discharged with ADOLFO drain. You will need to empty at least once daily and record the outputs accurately. This will be taught to you by the nursing staff.   Please do not remove the ADOLFO drain.  Please empty and record output daily, and discard contents.  Please bring recorded results to your follow up appointment.  They will be removed in the office when clinically indicated.   Please bring to the office, the recorded results of the daily recorded output.  You will be discharged with the Perveena VAC which will be removed at the office at your follow up appointmetn.  please keep area clean and dry.  Notify your physician/surgeon and return to ER for temperatures greater than 101, chills sweats, pain not controlled with pain medications, persistent nausea and vomiting, or acutely concerning matters to you, that may require urgent medical attention.

## 2019-10-02 NOTE — DISCHARGE NOTE PROVIDER - CARE PROVIDER_API CALL
Frankie Tse)  Plastic Surgery  [address], [address]  [place] [address]  Phone: [phone]  Fax: [phone]  Follow Up Time:     Carlos Lucas)  ColonRectal Surgery; Surgery  [address], [address]  [place] [address]  Phone: [phone]  Fax: [phone]  Follow Up Time:

## 2019-10-03 LAB — SURGICAL PATHOLOGY STUDY: SIGNIFICANT CHANGE UP

## 2019-10-04 ENCOUNTER — APPOINTMENT (OUTPATIENT)
Dept: SURGERY | Facility: CLINIC | Age: 54
End: 2019-10-04
Payer: MEDICAID

## 2019-10-04 VITALS
HEART RATE: 95 BPM | SYSTOLIC BLOOD PRESSURE: 113 MMHG | TEMPERATURE: 98.7 F | RESPIRATION RATE: 16 BRPM | DIASTOLIC BLOOD PRESSURE: 76 MMHG | OXYGEN SATURATION: 98 %

## 2019-10-04 DIAGNOSIS — K43.2 INCISIONAL HERNIA W/OUT OBSTRUCTION OR GANGRENE: ICD-10-CM

## 2019-10-04 PROCEDURE — 99024 POSTOP FOLLOW-UP VISIT: CPT

## 2019-10-04 NOTE — CONSULT LETTER
[Dear  ___] : Dear  [unfilled], [Consult Letter:] : I had the pleasure of evaluating your patient, [unfilled]. [Please see my note below.] : Please see my note below. [Consult Closing:] : Thank you very much for allowing me to participate in the care of this patient.  If you have any questions, please do not hesitate to contact me. [FreeTextEntry3] : Carlos Lucas M.D., F.A.C.S, F.A.S.C.R.S [Sincerely,] : Sincerely,

## 2019-10-04 NOTE — HISTORY OF PRESENT ILLNESS
[de-identified] : The patient is postoperative day #5 status post incisional hernia repair with mesh and concurrent excision of excess skin and subcutaneous tissues by Dr. Tse. Her postoperative course was uncomplicated. In the office today she feels well has minimal pain and is getting back to her normal activities

## 2019-10-04 NOTE — ASSESSMENT
[FreeTextEntry1] : In summary the patient is doing well postoperatively #5 status post incisional hernia repair with mesh and concurrent excision of excess skin and subcutaneous tissue by plastic surgery. I will see her again in 2 weeks. She will follow up with Dr. Tse for drain removal next week

## 2019-10-04 NOTE — REASON FOR VISIT
[Post Op: _________] : a [unfilled] post op visit [FreeTextEntry1] : Incisional hernia repair with mesh (30 x 15 and 8 x 15 cm ProGrip) which was creation of bilateral myofascial and subcutaneous flaps, and lysis of adhesions.

## 2019-10-04 NOTE — PHYSICAL EXAM
[de-identified] : Soft, nontender, karina VAC DC'd. Incisions healed, Steri-Strips applied no evidence of infection. Drains putting out between 30 and 40 cc of serosanguineous fluid daily.

## 2019-10-15 ENCOUNTER — APPOINTMENT (OUTPATIENT)
Dept: SURGERY | Facility: CLINIC | Age: 54
End: 2019-10-15
Payer: MEDICAID

## 2019-10-15 VITALS
SYSTOLIC BLOOD PRESSURE: 128 MMHG | RESPIRATION RATE: 16 BRPM | DIASTOLIC BLOOD PRESSURE: 75 MMHG | HEART RATE: 65 BPM | TEMPERATURE: 98.2 F | OXYGEN SATURATION: 98 %

## 2019-10-15 PROCEDURE — 99024 POSTOP FOLLOW-UP VISIT: CPT

## 2019-10-15 NOTE — HISTORY OF PRESENT ILLNESS
[de-identified] : The patient 2 weeks status post incisional hernia repair with mesh and concurrent excision of excess skin and subcutaneous tissues by Dr. Tse. Her postoperative course was uncomplicated. In the office today she feels well has minimal pain and is getting back to her normal activities. Her one remaining drain is putting out approximately 30 cc per day of serous fluid\par \par

## 2019-10-15 NOTE — REASON FOR VISIT
[Post Op: _________] : a [unfilled] post op visit [FreeTextEntry1] : Incisional hernia repair with mesh (30 x 15 and 8 x 15 cm ProGrip) which was creation of bilateral myofascial and subcutaneous flaps, and lysis of adhesions. \par \par

## 2019-10-15 NOTE — ASSESSMENT
[FreeTextEntry1] : In summary the patient is doing well status post incisional hernia repair with mesh and concurrent excision of redundant skin and subcutaneous tissues by Dr. Tse. She will follow up with him for removal. I will see her in one month for a checkup

## 2019-10-29 ENCOUNTER — INPATIENT (INPATIENT)
Facility: HOSPITAL | Age: 54
LOS: 3 days | Discharge: HOME | DRG: 857 | End: 2019-11-02
Attending: COLON & RECTAL SURGERY | Admitting: COLON & RECTAL SURGERY
Payer: MEDICAID

## 2019-10-29 ENCOUNTER — LABORATORY RESULT (OUTPATIENT)
Age: 54
End: 2019-10-29

## 2019-10-29 ENCOUNTER — APPOINTMENT (OUTPATIENT)
Dept: SURGERY | Facility: CLINIC | Age: 54
End: 2019-10-29
Payer: MEDICAID

## 2019-10-29 VITALS
RESPIRATION RATE: 15 BRPM | DIASTOLIC BLOOD PRESSURE: 75 MMHG | TEMPERATURE: 99.8 F | OXYGEN SATURATION: 98 % | HEART RATE: 82 BPM | SYSTOLIC BLOOD PRESSURE: 110 MMHG

## 2019-10-29 VITALS
RESPIRATION RATE: 20 BRPM | SYSTOLIC BLOOD PRESSURE: 109 MMHG | HEIGHT: 68 IN | OXYGEN SATURATION: 98 % | HEART RATE: 91 BPM | WEIGHT: 199.96 LBS | DIASTOLIC BLOOD PRESSURE: 77 MMHG | TEMPERATURE: 100 F

## 2019-10-29 DIAGNOSIS — Z98.890 OTHER SPECIFIED POSTPROCEDURAL STATES: Chronic | ICD-10-CM

## 2019-10-29 DIAGNOSIS — T81.9XXS UNSPECIFIED COMPLICATION OF PROCEDURE, SEQUELA: ICD-10-CM

## 2019-10-29 DIAGNOSIS — Z98.891 HISTORY OF UTERINE SCAR FROM PREVIOUS SURGERY: Chronic | ICD-10-CM

## 2019-10-29 DIAGNOSIS — Z90.89 ACQUIRED ABSENCE OF OTHER ORGANS: Chronic | ICD-10-CM

## 2019-10-29 DIAGNOSIS — Z90.49 ACQUIRED ABSENCE OF OTHER SPECIFIED PARTS OF DIGESTIVE TRACT: Chronic | ICD-10-CM

## 2019-10-29 LAB
ALBUMIN SERPL ELPH-MCNC: 3.6 G/DL — SIGNIFICANT CHANGE UP (ref 3.3–5)
ALP SERPL-CCNC: 55 U/L — SIGNIFICANT CHANGE UP (ref 40–120)
ALT FLD-CCNC: 11 U/L — SIGNIFICANT CHANGE UP (ref 10–45)
ANION GAP SERPL CALC-SCNC: 10 MMOL/L — SIGNIFICANT CHANGE UP (ref 5–17)
APPEARANCE UR: CLEAR — SIGNIFICANT CHANGE UP
APTT BLD: 28.4 SEC — SIGNIFICANT CHANGE UP (ref 27.5–36.3)
AST SERPL-CCNC: 13 U/L — SIGNIFICANT CHANGE UP (ref 10–40)
BACTERIA # UR AUTO: ABNORMAL
BASE EXCESS BLDV CALC-SCNC: 6 MMOL/L — HIGH (ref -2–2)
BASOPHILS # BLD AUTO: 0.03 K/UL — SIGNIFICANT CHANGE UP (ref 0–0.2)
BASOPHILS NFR BLD AUTO: 0.5 % — SIGNIFICANT CHANGE UP (ref 0–2)
BILIRUB SERPL-MCNC: 0.3 MG/DL — SIGNIFICANT CHANGE UP (ref 0.2–1.2)
BILIRUB UR-MCNC: NEGATIVE — SIGNIFICANT CHANGE UP
BLD GP AB SCN SERPL QL: NEGATIVE — SIGNIFICANT CHANGE UP
BUN SERPL-MCNC: 11 MG/DL — SIGNIFICANT CHANGE UP (ref 7–23)
CA-I SERPL-SCNC: 1.18 MMOL/L — SIGNIFICANT CHANGE UP (ref 1.12–1.3)
CALCIUM SERPL-MCNC: 9.4 MG/DL — SIGNIFICANT CHANGE UP (ref 8.4–10.5)
CHLORIDE BLDV-SCNC: 104 MMOL/L — SIGNIFICANT CHANGE UP (ref 96–108)
CHLORIDE SERPL-SCNC: 96 MMOL/L — SIGNIFICANT CHANGE UP (ref 96–108)
CO2 BLDV-SCNC: 33 MMOL/L — HIGH (ref 22–30)
CO2 SERPL-SCNC: 28 MMOL/L — SIGNIFICANT CHANGE UP (ref 22–31)
COLOR SPEC: SIGNIFICANT CHANGE UP
CREAT SERPL-MCNC: 0.76 MG/DL — SIGNIFICANT CHANGE UP (ref 0.5–1.3)
DIFF PNL FLD: ABNORMAL
EOSINOPHIL # BLD AUTO: 0.14 K/UL — SIGNIFICANT CHANGE UP (ref 0–0.5)
EOSINOPHIL NFR BLD AUTO: 2.2 % — SIGNIFICANT CHANGE UP (ref 0–6)
EPI CELLS # UR: 1 /HPF — SIGNIFICANT CHANGE UP
GAS PNL BLDV: 134 MMOL/L — LOW (ref 135–145)
GAS PNL BLDV: SIGNIFICANT CHANGE UP
GLUCOSE BLDV-MCNC: 87 MG/DL — SIGNIFICANT CHANGE UP (ref 70–99)
GLUCOSE SERPL-MCNC: 88 MG/DL — SIGNIFICANT CHANGE UP (ref 70–99)
GLUCOSE UR QL: NEGATIVE — SIGNIFICANT CHANGE UP
HCO3 BLDV-SCNC: 32 MMOL/L — HIGH (ref 21–29)
HCT VFR BLD CALC: 35.7 % — SIGNIFICANT CHANGE UP (ref 34.5–45)
HCT VFR BLDA CALC: 40 % — SIGNIFICANT CHANGE UP (ref 39–50)
HGB BLD CALC-MCNC: 12.9 G/DL — SIGNIFICANT CHANGE UP (ref 11.5–15.5)
HGB BLD-MCNC: 11.5 G/DL — SIGNIFICANT CHANGE UP (ref 11.5–15.5)
HYALINE CASTS # UR AUTO: 0 /LPF — SIGNIFICANT CHANGE UP (ref 0–2)
IMM GRANULOCYTES NFR BLD AUTO: 0.3 % — SIGNIFICANT CHANGE UP (ref 0–1.5)
INR BLD: 1.24 RATIO — HIGH (ref 0.88–1.16)
KETONES UR-MCNC: NEGATIVE — SIGNIFICANT CHANGE UP
LACTATE BLDV-MCNC: 0.9 MMOL/L — SIGNIFICANT CHANGE UP (ref 0.7–2)
LEUKOCYTE ESTERASE UR-ACNC: ABNORMAL
LYMPHOCYTES # BLD AUTO: 0.74 K/UL — LOW (ref 1–3.3)
LYMPHOCYTES # BLD AUTO: 11.4 % — LOW (ref 13–44)
MCHC RBC-ENTMCNC: 29.3 PG — SIGNIFICANT CHANGE UP (ref 27–34)
MCHC RBC-ENTMCNC: 32.2 GM/DL — SIGNIFICANT CHANGE UP (ref 32–36)
MCV RBC AUTO: 91.1 FL — SIGNIFICANT CHANGE UP (ref 80–100)
MONOCYTES # BLD AUTO: 0.82 K/UL — SIGNIFICANT CHANGE UP (ref 0–0.9)
MONOCYTES NFR BLD AUTO: 12.6 % — SIGNIFICANT CHANGE UP (ref 2–14)
NEUTROPHILS # BLD AUTO: 4.76 K/UL — SIGNIFICANT CHANGE UP (ref 1.8–7.4)
NEUTROPHILS NFR BLD AUTO: 73 % — SIGNIFICANT CHANGE UP (ref 43–77)
NITRITE UR-MCNC: NEGATIVE — SIGNIFICANT CHANGE UP
NRBC # BLD: 0 /100 WBCS — SIGNIFICANT CHANGE UP (ref 0–0)
PCO2 BLDV: 52 MMHG — HIGH (ref 35–50)
PH BLDV: 7.4 — SIGNIFICANT CHANGE UP (ref 7.35–7.45)
PH UR: 6.5 — SIGNIFICANT CHANGE UP (ref 5–8)
PLATELET # BLD AUTO: 265 K/UL — SIGNIFICANT CHANGE UP (ref 150–400)
PO2 BLDV: 21 MMHG — LOW (ref 25–45)
POTASSIUM BLDV-SCNC: 3.8 MMOL/L — SIGNIFICANT CHANGE UP (ref 3.5–5.3)
POTASSIUM SERPL-MCNC: 4 MMOL/L — SIGNIFICANT CHANGE UP (ref 3.5–5.3)
POTASSIUM SERPL-SCNC: 4 MMOL/L — SIGNIFICANT CHANGE UP (ref 3.5–5.3)
PROT SERPL-MCNC: 7 G/DL — SIGNIFICANT CHANGE UP (ref 6–8.3)
PROT UR-MCNC: NEGATIVE — SIGNIFICANT CHANGE UP
PROTHROM AB SERPL-ACNC: 14.2 SEC — HIGH (ref 10–12.9)
RBC # BLD: 3.92 M/UL — SIGNIFICANT CHANGE UP (ref 3.8–5.2)
RBC # FLD: 13.8 % — SIGNIFICANT CHANGE UP (ref 10.3–14.5)
RBC CASTS # UR COMP ASSIST: 5 /HPF — HIGH (ref 0–4)
RH IG SCN BLD-IMP: POSITIVE — SIGNIFICANT CHANGE UP
SAO2 % BLDV: 31 % — LOW (ref 67–88)
SODIUM SERPL-SCNC: 134 MMOL/L — LOW (ref 135–145)
SP GR SPEC: 1.01 — LOW (ref 1.01–1.02)
UROBILINOGEN FLD QL: NEGATIVE — SIGNIFICANT CHANGE UP
WBC # BLD: 6.51 K/UL — SIGNIFICANT CHANGE UP (ref 3.8–10.5)
WBC # FLD AUTO: 6.51 K/UL — SIGNIFICANT CHANGE UP (ref 3.8–10.5)
WBC UR QL: 8 /HPF — HIGH (ref 0–5)

## 2019-10-29 PROCEDURE — 99024 POSTOP FOLLOW-UP VISIT: CPT

## 2019-10-29 PROCEDURE — 74177 CT ABD & PELVIS W/CONTRAST: CPT | Mod: 26

## 2019-10-29 PROCEDURE — 99284 EMERGENCY DEPT VISIT MOD MDM: CPT

## 2019-10-29 RX ORDER — VANCOMYCIN HCL 1 G
VIAL (EA) INTRAVENOUS
Refills: 0 | Status: DISCONTINUED | OUTPATIENT
Start: 2019-10-29 | End: 2019-11-01

## 2019-10-29 RX ORDER — PIPERACILLIN AND TAZOBACTAM 4; .5 G/20ML; G/20ML
3.38 INJECTION, POWDER, LYOPHILIZED, FOR SOLUTION INTRAVENOUS ONCE
Refills: 0 | Status: COMPLETED | OUTPATIENT
Start: 2019-10-29 | End: 2019-10-29

## 2019-10-29 RX ORDER — ENOXAPARIN SODIUM 100 MG/ML
40 INJECTION SUBCUTANEOUS EVERY 24 HOURS
Refills: 0 | Status: DISCONTINUED | OUTPATIENT
Start: 2019-10-29 | End: 2019-11-02

## 2019-10-29 RX ORDER — ACETAMINOPHEN 500 MG
650 TABLET ORAL EVERY 6 HOURS
Refills: 0 | Status: DISCONTINUED | OUTPATIENT
Start: 2019-10-29 | End: 2019-11-02

## 2019-10-29 RX ORDER — ASCORBIC ACID 60 MG
2 TABLET,CHEWABLE ORAL
Qty: 0 | Refills: 0 | DISCHARGE

## 2019-10-29 RX ORDER — CETIRIZINE HYDROCHLORIDE 10 MG/1
1 TABLET ORAL
Qty: 0 | Refills: 0 | DISCHARGE

## 2019-10-29 RX ORDER — PREGABALIN 225 MG/1
1 CAPSULE ORAL
Qty: 0 | Refills: 0 | DISCHARGE

## 2019-10-29 RX ORDER — ACETAMINOPHEN 500 MG
650 TABLET ORAL ONCE
Refills: 0 | Status: COMPLETED | OUTPATIENT
Start: 2019-10-29 | End: 2019-10-29

## 2019-10-29 RX ORDER — ACETAMINOPHEN 500 MG
1000 TABLET ORAL ONCE
Refills: 0 | Status: DISCONTINUED | OUTPATIENT
Start: 2019-10-29 | End: 2019-10-29

## 2019-10-29 RX ORDER — SODIUM CHLORIDE 9 MG/ML
1000 INJECTION INTRAMUSCULAR; INTRAVENOUS; SUBCUTANEOUS ONCE
Refills: 0 | Status: COMPLETED | OUTPATIENT
Start: 2019-10-29 | End: 2019-10-29

## 2019-10-29 RX ORDER — VANCOMYCIN HCL 1 G
1000 VIAL (EA) INTRAVENOUS EVERY 24 HOURS
Refills: 0 | Status: DISCONTINUED | OUTPATIENT
Start: 2019-10-30 | End: 2019-11-01

## 2019-10-29 RX ORDER — ZINC SULFATE TAB 220 MG (50 MG ZINC EQUIVALENT) 220 (50 ZN) MG
15 TAB ORAL
Qty: 0 | Refills: 0 | DISCHARGE

## 2019-10-29 RX ORDER — LORATADINE 10 MG/1
10 TABLET ORAL DAILY
Refills: 0 | Status: DISCONTINUED | OUTPATIENT
Start: 2019-10-29 | End: 2019-11-02

## 2019-10-29 RX ORDER — VANCOMYCIN HCL 1 G
1000 VIAL (EA) INTRAVENOUS ONCE
Refills: 0 | Status: COMPLETED | OUTPATIENT
Start: 2019-10-29 | End: 2019-10-29

## 2019-10-29 RX ADMIN — Medication 650 MILLIGRAM(S): at 22:00

## 2019-10-29 RX ADMIN — Medication 650 MILLIGRAM(S): at 13:47

## 2019-10-29 RX ADMIN — Medication 250 MILLIGRAM(S): at 20:31

## 2019-10-29 RX ADMIN — Medication 650 MILLIGRAM(S): at 16:15

## 2019-10-29 RX ADMIN — PIPERACILLIN AND TAZOBACTAM 200 GRAM(S): 4; .5 INJECTION, POWDER, LYOPHILIZED, FOR SOLUTION INTRAVENOUS at 16:14

## 2019-10-29 RX ADMIN — SODIUM CHLORIDE 1000 MILLILITER(S): 9 INJECTION INTRAMUSCULAR; INTRAVENOUS; SUBCUTANEOUS at 13:46

## 2019-10-29 RX ADMIN — Medication 650 MILLIGRAM(S): at 22:30

## 2019-10-29 NOTE — H&P ADULT - ASSESSMENT
ASSESSMENT  54F 1 month postop from incisional hernia repair with plastics reconstruction presenting with drainage from the surgical wound    PLAN  - Admit to [facility] - under Dr. Lucas  - IV antibiotics Vancomycin and Zosyn   - F/u wound cultures from outpatient office and ED   - Regular diet  - Pain control as needed  - Plastics aware  - Discussed with Dr. Shayy Zavaleta Surgery   [id number]

## 2019-10-29 NOTE — PHYSICAL EXAM
[de-identified] : Soft, nontender, open area at the central portion of her lower midline incision and a second open area in the lateral aspect of her wound. Both open areas were probed and a small amount of seropurulent fluid was evacuated and sent for culture and sensitivity.There is mild surrounding erythema of the incision

## 2019-10-29 NOTE — CONSULT NOTE ADULT - SUBJECTIVE AND OBJECTIVE BOX
[facility] CONSULT NOTE    HPI:   54F with Hx diverticulitis  s/p colon resection and colostomy  (with subsequent reversal of colostomy). Pt has developed large incisional hernia, with c/o occasional discomfort and is now s/p incisional hernia repair with plastics reconstruction in 2019. She had been recovering well from surgery for the last three weeks and her LLQ surgical drain was removed on 10/21. However, she has had continued drainage of dark red/brown murky fluid from the drain site and the central portion of her wound. She had not had much pain in the abdomen or from the draining portions of her surgical wounds, but on 10/24, she developed right leg pain and decided to present to [facility]. Sbe had had swelling of her RLE but that resolved yesterday. At the OSH, work up was negative for DVT, she was found to have a pocket of fluid at her incision site but she was discharged home without antibiotics. Yesterday she developed a fever to 101 and noted increased drainage from the central portion of her wound so she presented to Dr. Lucas's office this morning. She was then advised to come to the ED by Dr. Lucas. Denies nausea or vomiting. Has had some loose stools but denies diarrhea, has been tolerating PO.       PMHx: H/O ulcerative colitis  Incisional hernia  Allergic bronchitis  Diverticulitis    PSHx: History of colostomy reversal  History of colon resection  H/O  section  History of tonsillectomy    Medications (inpatient): piperacillin/tazobactam IVPB. 3.375 Gram(s) IV Intermittent Once    Medications (PRN):  Allergies: Hayfever (Rhinorrhea)  Kiwi (Rash)  No Known Drug Allergies  Pepper (Anaphylaxis)  Quinoa (Rash)  shellfish (Nausea; Rash)  (Intolerances: )  Social Hx: Denies smoking history, denies alcohol use  Family Hx: No pertinent family hx    Physical Exam  T(C): 36.8  HR: 63 (63 - 91)  BP: 112/73 (109/77 - 112/73)  RR: 16 (16 - 20)  SpO2: 96% (96% - 98%)  Tmax: T(C): , Max: 37.8 (10-29-19 @ 11:02)    General: well developed, well nourished, NAD  Neuro: alert and oriented, no focal deficits, moves all extremities spontaneously  Respiratory:  nonlabored on RA  CVS: regular rate and rhythm  Abdomen: soft, nondistended, the very central portion of the lower abdominal wound is open about 1cm x 1cm and draining seropurulent fluid. Culture taking of the wound. No surrounding erythema. mildly tender over the area of drainage. LLQ drain site appears clean without drainage or surrounding erythema.  Extremities: no edema, sensation and movement grossly intact  Skin: warm, dry, appropriate color    Labs:                        11.5   6.51  )-----------( 265      ( 29 Oct 2019 12:48 )             35.7     PT/INR - ( 29 Oct 2019 12:48 )   PT: 14.2 sec;   INR: 1.24 ratio         PTT - ( 29 Oct 2019 12:48 )  PTT:28.4 sec  10-29    134<L>  |  96  |  11  ----------------------------<  88  4.0   |  28  |  0.76    Ca    9.4      29 Oct 2019 12:48    TPro  7.0  /  Alb  3.6  /  TBili  0.3  /  DBili  x   /  AST  13  /  ALT  11  /  AlkPhos  55  10-29    Urinalysis Basic - ( 29 Oct 2019 14:03 )    Color: Light Yellow / Appearance: Clear / S.006 / pH: x  Gluc: x / Ketone: Negative  / Bili: Negative / Urobili: Negative   Blood: x / Protein: Negative / Nitrite: Negative   Leuk Esterase: Large / RBC: 5 /hpf / WBC 8 /HPF   Sq Epi: x / Non Sq Epi: 1 /hpf / Bacteria: Few

## 2019-10-29 NOTE — H&P ADULT - HISTORY OF PRESENT ILLNESS
[facility] CONSULT NOTE    HPI:   54F with Hx diverticulitis  s/p colon resection and colostomy  (with subsequent reversal of colostomy). Pt has developed large incisional hernia, with c/o occasional discomfort and is now s/p incisional hernia repair with plastics reconstruction in 2019. She had been recovering well from surgery for the last three weeks and her LLQ surgical drain was removed on 10/21. However, she has had continued drainage of dark red/brown murky fluid from the drain site and the central portion of her wound. She had not had much pain in the abdomen or from the draining portions of her surgical wounds, but on 10/24, she developed right leg pain and decided to present to [facility]. Sbe had had swelling of her RLE but that resolved yesterday. At the OSH, work up was negative for DVT, she was found to have a pocket of fluid at her incision site but she was discharged home without antibiotics. Yesterday she developed a fever to 101 and noted increased drainage from the central portion of her wound so she presented to Dr. Lucas's office this morning. She was then advised to come to the ED by Dr. Lucas. Denies nausea or vomiting. Has had some loose stools but denies diarrhea, has been tolerating PO.       PMHx: H/O ulcerative colitis  Incisional hernia  Allergic bronchitis  Diverticulitis    PSHx: History of colostomy reversal  History of colon resection  H/O  section  History of tonsillectomy    Medications (inpatient): piperacillin/tazobactam IVPB. 3.375 Gram(s) IV Intermittent Once    Medications (PRN):  Allergies: Hayfever (Rhinorrhea)  Kiwi (Rash)  No Known Drug Allergies  Pepper (Anaphylaxis)  Quinoa (Rash)  shellfish (Nausea; Rash)  (Intolerances: )  Social Hx: Denies smoking history, denies alcohol use  Family Hx: No pertinent family hx

## 2019-10-29 NOTE — ED PROVIDER NOTE - CLINICAL SUMMARY MEDICAL DECISION MAKING FREE TEXT BOX
54F hx of diverticulitis s/p resection, colostomy creation and reversal, recent repair of incisional hernia (9/30/19) who presents from surgery office with incisional site drainage. Labs, imaging, surgery consult.

## 2019-10-29 NOTE — ED ADULT NURSE NOTE - OBJECTIVE STATEMENT
Pt is a 54y Female, A&Ox3, presented to the ED c/o surgical wound drainage. Pt had abd surgery to repair hernia on 9/30 and last drain was removed from LLQ last Monday. As per pt, the drainage began over the weekend, which was described as "smelly, bloody, and having substance to it". Pt reports severe pain that has got worse over the past few weeks and a fever of 101F last night. Currently, pt states pain 9/10 with intermittent nausea, afebrile. Denies v/d, dizziness, headache, chills. Abd round, soft, + tenderness to touch, w/o ecchymosis. Pt states LBM was last night (normal appearance, w/o blood), less than normal PO intake. Denies sob, cp, dysuria, hematuria. Ambulatory w/o assist, family at bedside, comfort and safety measures provided.

## 2019-10-29 NOTE — ED PROVIDER NOTE - OBJECTIVE STATEMENT
54F hx of diverticulitis s/p resection, colostomy creation and reversal, recent repair of incisional hernia (9/30/19) who presents from surgery office with incisional site drainage. Initially after discharge patient was feeling well, had drain left in LLQ incision site which was removed last Monday. Since then she has had increased drainage, initially green/yellow now bloody associated with pain at site. She went to [facility] on Wednesday for RLE pain, was r/o for DVT, also had CTAP done which showed fluid collections. She followed up with Dr. Lucas today and suggested to come to ED. She has been having low grade fevers, last night temp of 101, also endorses nausea. Denies cp, sob, v/d, dysuria.    Surgery - Dr. Lucas

## 2019-10-29 NOTE — H&P ADULT - NSHPLABSRESULTS_GEN_ALL_CORE
Labs:                        11.5   6.51  )-----------( 265      ( 29 Oct 2019 12:48 )             35.7     PT/INR - ( 29 Oct 2019 12:48 )   PT: 14.2 sec;   INR: 1.24 ratio         PTT - ( 29 Oct 2019 12:48 )  PTT:28.4 sec  10-29    134<L>  |  96  |  11  ----------------------------<  88  4.0   |  28  |  0.76    Ca    9.4      29 Oct 2019 12:48    TPro  7.0  /  Alb  3.6  /  TBili  0.3  /  DBili  x   /  AST  13  /  ALT  11  /  AlkPhos  55  10-29    Urinalysis Basic - ( 29 Oct 2019 14:03 )    Color: Light Yellow / Appearance: Clear / S.006 / pH: x  Gluc: x / Ketone: Negative  / Bili: Negative / Urobili: Negative   Blood: x / Protein: Negative / Nitrite: Negative   Leuk Esterase: Large / RBC: 5 /hpf / WBC 8 /HPF   Sq Epi: x / Non Sq Epi: 1 /hpf / Bacteria: Few      < from: CT Abdomen and Pelvis w/ IV Cont (10.29.19 @ 18:14) >      Findings:     Lung Bases: The lung bases are clear. The visualized portions of the   heart and chest wall are unremarkable.    Liver and Spleen: No focal lesions within the liver or spleen. The   gallbladder is present     Pancreas and Adrenals: Unremarkable    Kidneys: Both kidneys enhance with contrast and show no evidence of   stones, contour-deforming masses, or hydronephrosis.    Retroperitonium: No lymphadenopathy. The retroperitoneal vasculature is   within normal limits.    Appendix: Normal in appearance    Bowel: No evidence of bowel obstruction.    Pelvis:  Pelvic organs are unremarkable. Urinary bladder is distended.     Bones and soft tissues: No focal lytic or blastic lesions. Post surgical   changes in the anterior abdominal wall with phlegmon without associated   drainable abscess collection.    IMPRESSION:     Post surgical changes in the anterior abdominal wall with phlegmon   without associated drainable abscess collection.    Markedly distended urinary bladder. Correlate for retention.      < end of copied text >

## 2019-10-29 NOTE — H&P ADULT - NSHPPHYSICALEXAM_GEN_ALL_CORE
Physical Exam  T(C): 36.8  HR: 63 (63 - 91)  BP: 112/73 (109/77 - 112/73)  RR: 16 (16 - 20)  SpO2: 96% (96% - 98%)  Tmax: T(C): , Max: 37.8 (10-29-19 @ 11:02)    General: well developed, well nourished, NAD  Neuro: alert and oriented, no focal deficits, moves all extremities spontaneously  Respiratory:  nonlabored on RA  CVS: regular rate and rhythm  Abdomen: soft, nondistended, the very central portion of the lower abdominal wound is open about 1cm x 1cm and draining seropurulent fluid. Culture taking of the wound. No surrounding erythema. mildly tender over the area of drainage. LLQ drain site appears clean without drainage or surrounding erythema.  Extremities: no edema, sensation and movement grossly intact  Skin: warm, dry, appropriate color

## 2019-10-29 NOTE — HISTORY OF PRESENT ILLNESS
[de-identified] : Ruthann is a 53 y/o female here for post-operative visit. 9/30/19- incisional hernia repair with mesh (30 x 15 and 8 x 15 cm ProGrip) which was creation of bilateral myofascial and subcutaneous flaps, and lysis of adhesions. (Concurrent excision of excess skin and subcutaneous tissues by Dr. Tse). Pathology: consistent with hernia sac. Last seen on 10/15/19. Had ADOLFO drain removed by Dr. Tse on 10/21/19. Today, patient reports incisional drainage from middle incision and prior drain site. Reports low grade fevers and incisional pain. She was seen in the emergency room at [facility] for 5 days ago. She states that she was diagnosed with a fluid collection on CT and discharged.

## 2019-10-29 NOTE — ASSESSMENT
[FreeTextEntry1] : In summary the patient has a postoperative wound infection. She was sent to ER at [facility] for imaging antibiotics and admission. Further intervention will be based on imaging findings

## 2019-10-29 NOTE — ED CLERICAL - NS ED CLERK NOTE PRE-ARRIVAL INFORMATION; ADDITIONAL PRE-ARRIVAL INFORMATION
CC/Reason For referral: wound infection post op  Preferred Consultant(if applicable): [id number] surgery resident   Who admits for you (if needed):  Do you have documents you would like to fax over?  Would you still like to speak to an ED attending?

## 2019-10-29 NOTE — CONSULT NOTE ADULT - ASSESSMENT
ASSESSMENT  54F 1 month postop from incisional hernia repair with plastics reconstruction presenting with drainage from the surgical wound    PLAN  - CTAP with IV contrast  - IV antibiotics  - Discussed with Dr. Shayy Zavaleta Surgery   [id number]

## 2019-10-30 LAB
ANION GAP SERPL CALC-SCNC: 13 MMOL/L — SIGNIFICANT CHANGE UP (ref 5–17)
BUN SERPL-MCNC: 9 MG/DL — SIGNIFICANT CHANGE UP (ref 7–23)
CALCIUM SERPL-MCNC: 9.7 MG/DL — SIGNIFICANT CHANGE UP (ref 8.4–10.5)
CHLORIDE SERPL-SCNC: 99 MMOL/L — SIGNIFICANT CHANGE UP (ref 96–108)
CO2 SERPL-SCNC: 28 MMOL/L — SIGNIFICANT CHANGE UP (ref 22–31)
CREAT SERPL-MCNC: 0.75 MG/DL — SIGNIFICANT CHANGE UP (ref 0.5–1.3)
GLUCOSE SERPL-MCNC: 106 MG/DL — HIGH (ref 70–99)
HCT VFR BLD CALC: 39.6 % — SIGNIFICANT CHANGE UP (ref 34.5–45)
HGB BLD-MCNC: 12.3 G/DL — SIGNIFICANT CHANGE UP (ref 11.5–15.5)
MAGNESIUM SERPL-MCNC: 2 MG/DL — SIGNIFICANT CHANGE UP (ref 1.6–2.6)
MCHC RBC-ENTMCNC: 28.3 PG — SIGNIFICANT CHANGE UP (ref 27–34)
MCHC RBC-ENTMCNC: 31.1 GM/DL — LOW (ref 32–36)
MCV RBC AUTO: 91.2 FL — SIGNIFICANT CHANGE UP (ref 80–100)
NRBC # BLD: 0 /100 WBCS — SIGNIFICANT CHANGE UP (ref 0–0)
PHOSPHATE SERPL-MCNC: 2.6 MG/DL — SIGNIFICANT CHANGE UP (ref 2.5–4.5)
PLATELET # BLD AUTO: 309 K/UL — SIGNIFICANT CHANGE UP (ref 150–400)
POTASSIUM SERPL-MCNC: 3.9 MMOL/L — SIGNIFICANT CHANGE UP (ref 3.5–5.3)
POTASSIUM SERPL-SCNC: 3.9 MMOL/L — SIGNIFICANT CHANGE UP (ref 3.5–5.3)
RBC # BLD: 4.34 M/UL — SIGNIFICANT CHANGE UP (ref 3.8–5.2)
RBC # FLD: 13.6 % — SIGNIFICANT CHANGE UP (ref 10.3–14.5)
SODIUM SERPL-SCNC: 140 MMOL/L — SIGNIFICANT CHANGE UP (ref 135–145)
WBC # BLD: 7.05 K/UL — SIGNIFICANT CHANGE UP (ref 3.8–10.5)
WBC # FLD AUTO: 7.05 K/UL — SIGNIFICANT CHANGE UP (ref 3.8–10.5)

## 2019-10-30 RX ORDER — POTASSIUM PHOSPHATE, MONOBASIC POTASSIUM PHOSPHATE, DIBASIC 236; 224 MG/ML; MG/ML
15 INJECTION, SOLUTION INTRAVENOUS ONCE
Refills: 0 | Status: COMPLETED | OUTPATIENT
Start: 2019-10-30 | End: 2019-10-30

## 2019-10-30 RX ORDER — PIPERACILLIN AND TAZOBACTAM 4; .5 G/20ML; G/20ML
3.38 INJECTION, POWDER, LYOPHILIZED, FOR SOLUTION INTRAVENOUS ONCE
Refills: 0 | Status: COMPLETED | OUTPATIENT
Start: 2019-10-30 | End: 2019-10-30

## 2019-10-30 RX ORDER — MORPHINE SULFATE 50 MG/1
2 CAPSULE, EXTENDED RELEASE ORAL ONCE
Refills: 0 | Status: DISCONTINUED | OUTPATIENT
Start: 2019-10-30 | End: 2019-10-30

## 2019-10-30 RX ORDER — PIPERACILLIN AND TAZOBACTAM 4; .5 G/20ML; G/20ML
3.38 INJECTION, POWDER, LYOPHILIZED, FOR SOLUTION INTRAVENOUS EVERY 8 HOURS
Refills: 0 | Status: DISCONTINUED | OUTPATIENT
Start: 2019-10-30 | End: 2019-11-02

## 2019-10-30 RX ORDER — POVIDONE-IODINE 5 %
1 AEROSOL (ML) TOPICAL ONCE
Refills: 0 | Status: COMPLETED | OUTPATIENT
Start: 2019-10-30 | End: 2019-10-30

## 2019-10-30 RX ADMIN — POTASSIUM PHOSPHATE, MONOBASIC POTASSIUM PHOSPHATE, DIBASIC 62.5 MILLIMOLE(S): 236; 224 INJECTION, SOLUTION INTRAVENOUS at 16:00

## 2019-10-30 RX ADMIN — Medication 650 MILLIGRAM(S): at 20:39

## 2019-10-30 RX ADMIN — Medication 250 MILLIGRAM(S): at 20:16

## 2019-10-30 RX ADMIN — ENOXAPARIN SODIUM 40 MILLIGRAM(S): 100 INJECTION SUBCUTANEOUS at 13:09

## 2019-10-30 RX ADMIN — LORATADINE 10 MILLIGRAM(S): 10 TABLET ORAL at 13:10

## 2019-10-30 RX ADMIN — MORPHINE SULFATE 2 MILLIGRAM(S): 50 CAPSULE, EXTENDED RELEASE ORAL at 16:12

## 2019-10-30 RX ADMIN — PIPERACILLIN AND TAZOBACTAM 200 GRAM(S): 4; .5 INJECTION, POWDER, LYOPHILIZED, FOR SOLUTION INTRAVENOUS at 09:17

## 2019-10-30 RX ADMIN — Medication 650 MILLIGRAM(S): at 20:09

## 2019-10-30 RX ADMIN — PIPERACILLIN AND TAZOBACTAM 25 GRAM(S): 4; .5 INJECTION, POWDER, LYOPHILIZED, FOR SOLUTION INTRAVENOUS at 13:10

## 2019-10-30 RX ADMIN — Medication 1 APPLICATION(S): at 16:03

## 2019-10-30 RX ADMIN — MORPHINE SULFATE 2 MILLIGRAM(S): 50 CAPSULE, EXTENDED RELEASE ORAL at 16:01

## 2019-10-30 RX ADMIN — Medication 650 MILLIGRAM(S): at 09:25

## 2019-10-30 RX ADMIN — PIPERACILLIN AND TAZOBACTAM 25 GRAM(S): 4; .5 INJECTION, POWDER, LYOPHILIZED, FOR SOLUTION INTRAVENOUS at 23:05

## 2019-10-30 RX ADMIN — Medication 650 MILLIGRAM(S): at 09:40

## 2019-10-30 NOTE — PROGRESS NOTE ADULT - ASSESSMENT
54F 1 month postop from incisional hernia repair with plastics reconstruction presenting with drainage from the surgical wound    PLAN  - IV antibiotics Vancomycin and Zosyn   - F/u wound cultures from outpatient office and ED   - Regular diet  - Pain control as needed  - Plastics aware    [facility] #[id number]

## 2019-10-30 NOTE — PROGRESS NOTE ADULT - SUBJECTIVE AND OBJECTIVE BOX
Morning Surgical Progress Note  Patient is a 54y old  Female who presents with a chief complaint of Surgical wound infection (29 Oct 2019 14:13)      SUBJECTIVE: Patient seen and examined at bedside with surgical team. Patient had an incisional hernia repair performed by  Dr. Lucas and Plastics     Vital Signs Last 24 Hrs  T(C): 37.1 (30 Oct 2019 00:32), Max: 37.8 (29 Oct 2019 11:02)  T(F): 98.7 (30 Oct 2019 00:32), Max: 100.1 (29 Oct 2019 11:02)  HR: 78 (30 Oct 2019 00:32) (61 - 91)  BP: 101/65 (30 Oct 2019 00:32) (90/60 - 117/76)  BP(mean): --  RR: 18 (30 Oct 2019 00:32) (15 - 20)  SpO2: 95% (30 Oct 2019 00:32) (94% - 98%)I&O's Detail    29 Oct 2019 07:01  -  30 Oct 2019 04:44  --------------------------------------------------------  IN:  Total IN: 0 mL    OUT:    Voided: 500 mL  Total OUT: 500 mL    Total NET: -500 mL      MEDICATIONS  (STANDING):  enoxaparin Injectable 40 milliGRAM(s) SubCutaneous every 24 hours  loratadine 10 milliGRAM(s) Oral daily  vancomycin  IVPB 1000 milliGRAM(s) IV Intermittent every 24 hours  vancomycin  IVPB        MEDICATIONS  (PRN):  acetaminophen   Tablet .. 650 milliGRAM(s) Oral every 6 hours PRN Mild Pain (1 - 3), Moderate Pain (4 - 6)      Physical Exam  Constitutional: A&Ox3, NAD  Respiratory:  Gastrointestinal:     LABS:                        11.5   6.51  )-----------( 265      ( 29 Oct 2019 12:48 )             35.7     10-    134<L>  |  96  |  11  ----------------------------<  88  4.0   |  28  |  0.76    Ca    9.4      29 Oct 2019 12:48    TPro  7.0  /  Alb  3.6  /  TBili  0.3  /  DBili  x   /  AST  13  /  ALT  11  /  AlkPhos  55  10-    PT/INR - ( 29 Oct 2019 12:48 )   PT: 14.2 sec;   INR: 1.24 ratio         PTT - ( 29 Oct 2019 12:48 )  PTT:28.4 sec  LIVER FUNCTIONS - ( 29 Oct 2019 12:48 )  Alb: 3.6 g/dL / Pro: 7.0 g/dL / ALK PHOS: 55 U/L / ALT: 11 U/L / AST: 13 U/L / GGT: x           Urinalysis Basic - ( 29 Oct 2019 14:03 )    Color: Light Yellow / Appearance: Clear / S.006 / pH: x  Gluc: x / Ketone: Negative  / Bili: Negative / Urobili: Negative   Blood: x / Protein: Negative / Nitrite: Negative   Leuk Esterase: Large / RBC: 5 /hpf / WBC 8 /HPF   Sq Epi: x / Non Sq Epi: 1 /hpf / Bacteria: Few      ABO Interpretation: A (10-29-19 @ 12:43)

## 2019-10-30 NOTE — CHART NOTE - NSCHARTNOTEFT_GEN_A_CORE
Procedure was performed by Dr. Lucas at the bedside. Patient was premedicated with morphine and laid supine. Wound culture was taken from the infraumbilical draining wound and sent to lab. The medial and left lateral abdominal area was prepped with betadine. Incision was made at the medial margin of the LLQ wound with an 11 blade then extended with a curve Josh to the infraumbilical wound. The wound was initially pack with Monzel solution soaked gauze and pressure was held for hemostasis. The wound was reassessed for bleeding and areas of bleeding was cauterized with silver nitrate stick. After adequate hemostasis was achieved, the wound was packed with betadine soaked Kerlex and covered with Abd pads and secured with tap.      Green surgery #[id number]

## 2019-10-31 LAB
-  AMIKACIN: SIGNIFICANT CHANGE UP
-  AMOXICILLIN/CLAVULANIC ACID: SIGNIFICANT CHANGE UP
-  AMPICILLIN/SULBACTAM: SIGNIFICANT CHANGE UP
-  AMPICILLIN: SIGNIFICANT CHANGE UP
-  AZTREONAM: SIGNIFICANT CHANGE UP
-  CEFAZOLIN: SIGNIFICANT CHANGE UP
-  CEFEPIME: SIGNIFICANT CHANGE UP
-  CEFOXITIN: SIGNIFICANT CHANGE UP
-  CEFTAZIDIME: SIGNIFICANT CHANGE UP
-  CEFTRIAXONE: SIGNIFICANT CHANGE UP
-  CEFUROXIME: SIGNIFICANT CHANGE UP
-  CIPROFLOXACIN: SIGNIFICANT CHANGE UP
-  ERTAPENEM: SIGNIFICANT CHANGE UP
-  GENTAMICIN: SIGNIFICANT CHANGE UP
-  LEVOFLOXACIN: SIGNIFICANT CHANGE UP
-  MEROPENEM: SIGNIFICANT CHANGE UP
-  PIPERACILLIN/TAZOBACTAM: SIGNIFICANT CHANGE UP
-  TIGECYCLINE: SIGNIFICANT CHANGE UP
-  TOBRAMYCIN: SIGNIFICANT CHANGE UP
-  TRIMETHOPRIM/SULFAMETHOXAZOLE: SIGNIFICANT CHANGE UP
ANION GAP SERPL CALC-SCNC: 11 MMOL/L — SIGNIFICANT CHANGE UP (ref 5–17)
BUN SERPL-MCNC: 10 MG/DL — SIGNIFICANT CHANGE UP (ref 7–23)
CALCIUM SERPL-MCNC: 8.7 MG/DL — SIGNIFICANT CHANGE UP (ref 8.4–10.5)
CHLORIDE SERPL-SCNC: 97 MMOL/L — SIGNIFICANT CHANGE UP (ref 96–108)
CO2 SERPL-SCNC: 30 MMOL/L — SIGNIFICANT CHANGE UP (ref 22–31)
CREAT SERPL-MCNC: 0.73 MG/DL — SIGNIFICANT CHANGE UP (ref 0.5–1.3)
GLUCOSE SERPL-MCNC: 99 MG/DL — SIGNIFICANT CHANGE UP (ref 70–99)
HCT VFR BLD CALC: 33.7 % — LOW (ref 34.5–45)
HGB BLD-MCNC: 10.3 G/DL — LOW (ref 11.5–15.5)
MAGNESIUM SERPL-MCNC: 2 MG/DL — SIGNIFICANT CHANGE UP (ref 1.6–2.6)
MCHC RBC-ENTMCNC: 28.5 PG — SIGNIFICANT CHANGE UP (ref 27–34)
MCHC RBC-ENTMCNC: 30.6 GM/DL — LOW (ref 32–36)
MCV RBC AUTO: 93.4 FL — SIGNIFICANT CHANGE UP (ref 80–100)
METHOD TYPE: SIGNIFICANT CHANGE UP
PHOSPHATE SERPL-MCNC: 3 MG/DL — SIGNIFICANT CHANGE UP (ref 2.5–4.5)
PLATELET # BLD AUTO: 264 K/UL — SIGNIFICANT CHANGE UP (ref 150–400)
POTASSIUM SERPL-MCNC: 4 MMOL/L — SIGNIFICANT CHANGE UP (ref 3.5–5.3)
POTASSIUM SERPL-SCNC: 4 MMOL/L — SIGNIFICANT CHANGE UP (ref 3.5–5.3)
RBC # BLD: 3.61 M/UL — LOW (ref 3.8–5.2)
RBC # FLD: 13.6 % — SIGNIFICANT CHANGE UP (ref 10.3–14.5)
SODIUM SERPL-SCNC: 138 MMOL/L — SIGNIFICANT CHANGE UP (ref 135–145)
WBC # BLD: 5.98 K/UL — SIGNIFICANT CHANGE UP (ref 3.8–10.5)
WBC # FLD AUTO: 5.98 K/UL — SIGNIFICANT CHANGE UP (ref 3.8–10.5)

## 2019-10-31 PROCEDURE — 88302 TISSUE EXAM BY PATHOLOGIST: CPT

## 2019-10-31 PROCEDURE — 80048 BASIC METABOLIC PNL TOTAL CA: CPT

## 2019-10-31 PROCEDURE — 84100 ASSAY OF PHOSPHORUS: CPT

## 2019-10-31 PROCEDURE — C1781: CPT

## 2019-10-31 PROCEDURE — 85027 COMPLETE CBC AUTOMATED: CPT

## 2019-10-31 PROCEDURE — 86900 BLOOD TYPING SEROLOGIC ABO: CPT

## 2019-10-31 PROCEDURE — C1889: CPT

## 2019-10-31 PROCEDURE — 74018 RADEX ABDOMEN 1 VIEW: CPT

## 2019-10-31 PROCEDURE — 86901 BLOOD TYPING SEROLOGIC RH(D): CPT

## 2019-10-31 PROCEDURE — 83735 ASSAY OF MAGNESIUM: CPT

## 2019-10-31 PROCEDURE — 86850 RBC ANTIBODY SCREEN: CPT

## 2019-10-31 PROCEDURE — 81025 URINE PREGNANCY TEST: CPT

## 2019-10-31 RX ADMIN — LORATADINE 10 MILLIGRAM(S): 10 TABLET ORAL at 12:35

## 2019-10-31 RX ADMIN — Medication 250 MILLIGRAM(S): at 20:34

## 2019-10-31 RX ADMIN — PIPERACILLIN AND TAZOBACTAM 25 GRAM(S): 4; .5 INJECTION, POWDER, LYOPHILIZED, FOR SOLUTION INTRAVENOUS at 14:18

## 2019-10-31 RX ADMIN — PIPERACILLIN AND TAZOBACTAM 25 GRAM(S): 4; .5 INJECTION, POWDER, LYOPHILIZED, FOR SOLUTION INTRAVENOUS at 21:43

## 2019-10-31 RX ADMIN — ENOXAPARIN SODIUM 40 MILLIGRAM(S): 100 INJECTION SUBCUTANEOUS at 12:35

## 2019-10-31 RX ADMIN — PIPERACILLIN AND TAZOBACTAM 25 GRAM(S): 4; .5 INJECTION, POWDER, LYOPHILIZED, FOR SOLUTION INTRAVENOUS at 07:36

## 2019-10-31 NOTE — PROGRESS NOTE ADULT - SUBJECTIVE AND OBJECTIVE BOX
pt seen  comfortable and in good spirits  LLQ incision opened at bedside by gen surg  now packed w kerlex  avss  wbc 5.8  no erythema no purulence  no tenderness    cont wet to dry, VAC in the next day or two

## 2019-10-31 NOTE — PROGRESS NOTE ADULT - SUBJECTIVE AND OBJECTIVE BOX
GENERAL SURGERY DAILY PROGRESS NOTE:    Interval:  No acute events overnight endorsed.    Subjective:  Patient seen and examined this am. Reports pain is well controlled. No other complaints. Denies fever. Denies HA/CP/SOB. Denies N/V/D. +Tolerating diet. +Voiding. +Passing flatus. +BM. +OOB.    Vital Signs Last 24 Hrs  T(C): 37.2 (30 Oct 2019 21:38), Max: 37.2 (30 Oct 2019 21:38)  T(F): 98.9 (30 Oct 2019 21:38), Max: 98.9 (30 Oct 2019 21:38)  HR: 64 (30 Oct 2019 21:38) (59 - 78)  BP: 100/65 (30 Oct 2019 21:38) (95/66 - 108/72)  BP(mean): --  RR: 18 (30 Oct 2019 21:38) (18 - 18)  SpO2: 96% (30 Oct 2019 21:38) (95% - 98%)    Exam:  Gen: NAD, resting in bed, alert and responding appropriately  Resp: Airway patent, non-labored respirations  Abd: Soft, ND, NTTP x 4 quadrants, no rebound or guarding. Incisions c/d/i  Ext: No edema, WWP  Neuro: AAOx3, no focal deficits    I&O's Detail    29 Oct 2019 07:01  -  30 Oct 2019 07:00  --------------------------------------------------------  IN:  Total IN: 0 mL    OUT:    Voided: 1350 mL  Total OUT: 1350 mL    Total NET: -1350 mL      30 Oct 2019 07:  -  31 Oct 2019 00:02  --------------------------------------------------------  IN:    IV PiggyBack: 350 mL    Oral Fluid: 960 mL    Solution: 250 mL  Total IN: 1560 mL    OUT:    Voided: 650 mL  Total OUT: 650 mL    Total NET: 910 mL          Daily     Daily     MEDICATIONS  (STANDING):  enoxaparin Injectable 40 milliGRAM(s) SubCutaneous every 24 hours  loratadine 10 milliGRAM(s) Oral daily  piperacillin/tazobactam IVPB.. 3.375 Gram(s) IV Intermittent every 8 hours  vancomycin  IVPB 1000 milliGRAM(s) IV Intermittent every 24 hours  vancomycin  IVPB        MEDICATIONS  (PRN):  acetaminophen   Tablet .. 650 milliGRAM(s) Oral every 6 hours PRN Mild Pain (1 - 3), Moderate Pain (4 - 6)      LABS:                        12.3   7.05  )-----------( 309      ( 30 Oct 2019 12:09 )             39.6     10-30    140  |  99  |  9   ----------------------------<  106<H>  3.9   |  28  |  0.75    Ca    9.7      30 Oct 2019 12:09  Phos  2.6     10-30  Mg     2.0     10-30    TPro  7.0  /  Alb  3.6  /  TBili  0.3  /  DBili  x   /  AST  13  /  ALT  11  /  AlkPhos  55  10-29    PT/INR - ( 29 Oct 2019 12:48 )   PT: 14.2 sec;   INR: 1.24 ratio         PTT - ( 29 Oct 2019 12:48 )  PTT:28.4 sec  Urinalysis Basic - ( 29 Oct 2019 14:03 )    Color: Light Yellow / Appearance: Clear / S.006 / pH: x  Gluc: x / Ketone: Negative  / Bili: Negative / Urobili: Negative   Blood: x / Protein: Negative / Nitrite: Negative   Leuk Esterase: Large / RBC: 5 /hpf / WBC 8 /HPF   Sq Epi: x / Non Sq Epi: 1 /hpf / Bacteria: Few        AMINA Lux, PGY-1  Green Team Surgery  [id number] with any questions GENERAL SURGERY DAILY PROGRESS NOTE:    Interval:  No acute events overnight endorsed.    Subjective:  Patient seen and examined this am. Reports pain is well controlled. No other complaints. Denies N/V/D. +Tolerating diet. +Voiding. +Passing flatus. +BM. +OOB.    Vital Signs Last 24 Hrs  T(C): 37.2 (30 Oct 2019 21:38), Max: 37.2 (30 Oct 2019 21:38)  T(F): 98.9 (30 Oct 2019 21:38), Max: 98.9 (30 Oct 2019 21:38)  HR: 64 (30 Oct 2019 21:38) (59 - 78)  BP: 100/65 (30 Oct 2019 21:38) (95/66 - 108/72)  BP(mean): --  RR: 18 (30 Oct 2019 21:38) (18 - 18)  SpO2: 96% (30 Oct 2019 21:38) (95% - 98%)    Exam:  Gen: NAD, resting in bed, alert and responding appropriately  Resp: Airway patent, non-labored respirations  Abd: Soft, ND, NTTP x 4 quadrants, no rebound or guarding. Dressing changed wound purulent drainage  Ext: No edema, WWP  Neuro: AAOx3, no focal deficits    I&O's Detail    29 Oct 2019 07:  -  30 Oct 2019 07:00  --------------------------------------------------------  IN:  Total IN: 0 mL    OUT:    Voided: 1350 mL  Total OUT: 1350 mL    Total NET: -1350 mL      30 Oct 2019 07:  -  31 Oct 2019 00:02  --------------------------------------------------------  IN:    IV PiggyBack: 350 mL    Oral Fluid: 960 mL    Solution: 250 mL  Total IN: 1560 mL    OUT:    Voided: 650 mL  Total OUT: 650 mL    Total NET: 910 mL          Daily     Daily     MEDICATIONS  (STANDING):  enoxaparin Injectable 40 milliGRAM(s) SubCutaneous every 24 hours  loratadine 10 milliGRAM(s) Oral daily  piperacillin/tazobactam IVPB.. 3.375 Gram(s) IV Intermittent every 8 hours  vancomycin  IVPB 1000 milliGRAM(s) IV Intermittent every 24 hours  vancomycin  IVPB        MEDICATIONS  (PRN):  acetaminophen   Tablet .. 650 milliGRAM(s) Oral every 6 hours PRN Mild Pain (1 - 3), Moderate Pain (4 - 6)      LABS:                        12.3   7.05  )-----------( 309      ( 30 Oct 2019 12:09 )             39.6     10-30    140  |  99  |  9   ----------------------------<  106<H>  3.9   |  28  |  0.75    Ca    9.7      30 Oct 2019 12:09  Phos  2.6     10-30  Mg     2.0     10-30    TPro  7.0  /  Alb  3.6  /  TBili  0.3  /  DBili  x   /  AST  13  /  ALT  11  /  AlkPhos  55  10-    PT/INR - ( 29 Oct 2019 12:48 )   PT: 14.2 sec;   INR: 1.24 ratio         PTT - ( 29 Oct 2019 12:48 )  PTT:28.4 sec  Urinalysis Basic - ( 29 Oct 2019 14:03 )    Color: Light Yellow / Appearance: Clear / S.006 / pH: x  Gluc: x / Ketone: Negative  / Bili: Negative / Urobili: Negative   Blood: x / Protein: Negative / Nitrite: Negative   Leuk Esterase: Large / RBC: 5 /hpf / WBC 8 /HPF   Sq Epi: x / Non Sq Epi: 1 /hpf / Bacteria: Few        AMINA Lux, PGY-1  Green Team Surgery  [id number] with any questions

## 2019-10-31 NOTE — PROGRESS NOTE ADULT - ASSESSMENT
54F 1 month postop from incisional hernia repair with plastics reconstruction presenting with drainage from the surgical wound    PLAN  - IV antibiotics Vancomycin and Zosyn   - F/u wound cultures from outpatient office and ED   - Regular diet  - Pain control as needed  - Plastics aware    Green surgery #[id number] 54F roughly 1 month postop from incisional hernia repair with plastics reconstruction presenting with drainage from the surgical wound now sp a bedside I&D on 10/30    PLAN  - IV antibiotics Vancomycin and Zosyn   - F/u wound cultures from outpatient office and ED   - Surgical swab came back few GNR ovn  - Regular diet  - Pain control as needed  - Plastics aware  - Monitor recent procedure site suture if bleeding    Green surgery #[id number]

## 2019-10-31 NOTE — PROGRESS NOTE ADULT - SUBJECTIVE AND OBJECTIVE BOX
Plastic Surgery    SUBJECTIVE:   Patient feels well, reports that her suprapubic swelling has improved    VITALS  T(C): 37.4 (10-31-19 @ 12:45), Max: 37.4 (10-31-19 @ 12:45)  HR: 65 (10-31-19 @ 12:45) (59 - 81)  BP: 95/64 (10-31-19 @ 12:45) (92/64 - 100/68)  RR: 18 (10-31-19 @ 12:45) (18 - 18)  SpO2: 98% (10-31-19 @ 12:45) (95% - 98%)  CAPILLARY BLOOD GLUCOSE          Is/Os    10-30 @ 07:01  -  10-31 @ 07:00  --------------------------------------------------------  IN:    IV PiggyBack: 350 mL    Oral Fluid: 960 mL    Solution: 250 mL    Solution: 200 mL  Total IN: 1760 mL    OUT:    Voided: 650 mL  Total OUT: 650 mL    Total NET: 1110 mL      10-31 @ 07:01  -  10-31 @ 14:51  --------------------------------------------------------  IN:    Oral Fluid: 620 mL  Total IN: 620 mL    OUT:    Voided: 300 mL  Total OUT: 300 mL    Total NET: 320 mL          PHYSICAL EXAM:   General: NAD, Lying in bed comfortably  Abd: Bulky dressing in place from dressing change 10/30, no strikethrough on dressing, no malodor. Superior aspect of incision outside dressing without erythema.  Decreased tenderness and edema compared to exam from yesterday    MEDICATIONS (STANDING): enoxaparin Injectable 40 milliGRAM(s) SubCutaneous every 24 hours  loratadine 10 milliGRAM(s) Oral daily  piperacillin/tazobactam IVPB.. 3.375 Gram(s) IV Intermittent every 8 hours  vancomycin  IVPB 1000 milliGRAM(s) IV Intermittent every 24 hours  vancomycin  IVPB        MEDICATIONS (PRN):acetaminophen   Tablet .. 650 milliGRAM(s) Oral every 6 hours PRN Mild Pain (1 - 3), Moderate Pain (4 - 6)      LABS  CBC (10-31 @ 08:59)                              10.3<L>                         5.98    )----------------(  264        --    % Neutrophils, --    % Lymphocytes, ANC: --                                  33.7<L>  CBC (10-30 @ 12:09)                              12.3                           7.05    )----------------(  309        --    % Neutrophils, --    % Lymphocytes, ANC: --                                  39.6      BMP (10-31 @ 06:48)             138     |  97      |  10    		Ca++ --      Ca 8.7                ---------------------------------( 99    		Mg 2.0                4.0     |  30      |  0.73  			Ph 3.0     BMP (10-30 @ 12:09)             140     |  99      |  9     		Ca++ --      Ca 9.7                ---------------------------------( 106<H>		Mg 2.0                3.9     |  28      |  0.75  			Ph 2.6

## 2019-10-31 NOTE — PROGRESS NOTE ADULT - ASSESSMENT
A/P 54F s/p incisional hernia repair now with SSI s/p bedside I&D 10/30  - Continue with daily dressing changes  - May consider VAC if amenable to general surgery  - Follow up wound cultures  - Continue with empiric antibiotics while awaiting cultures and sensitivities  - Appreciate general surgery care; care per primary

## 2019-11-01 ENCOUNTER — TRANSCRIPTION ENCOUNTER (OUTPATIENT)
Age: 54
End: 2019-11-01

## 2019-11-01 LAB
-  AMIKACIN: SIGNIFICANT CHANGE UP
-  AMOXICILLIN/CLAVULANIC ACID: SIGNIFICANT CHANGE UP
-  AMPICILLIN/SULBACTAM: SIGNIFICANT CHANGE UP
-  AMPICILLIN: SIGNIFICANT CHANGE UP
-  AZTREONAM: SIGNIFICANT CHANGE UP
-  CEFAZOLIN: SIGNIFICANT CHANGE UP
-  CEFEPIME: SIGNIFICANT CHANGE UP
-  CEFOXITIN: SIGNIFICANT CHANGE UP
-  CEFTRIAXONE: SIGNIFICANT CHANGE UP
-  CIPROFLOXACIN: SIGNIFICANT CHANGE UP
-  ERTAPENEM: SIGNIFICANT CHANGE UP
-  GENTAMICIN: SIGNIFICANT CHANGE UP
-  IMIPENEM: SIGNIFICANT CHANGE UP
-  LEVOFLOXACIN: SIGNIFICANT CHANGE UP
-  MEROPENEM: SIGNIFICANT CHANGE UP
-  PIPERACILLIN/TAZOBACTAM: SIGNIFICANT CHANGE UP
-  TOBRAMYCIN: SIGNIFICANT CHANGE UP
-  TRIMETHOPRIM/SULFAMETHOXAZOLE: SIGNIFICANT CHANGE UP
ANION GAP SERPL CALC-SCNC: 9 MMOL/L — SIGNIFICANT CHANGE UP (ref 5–17)
BUN SERPL-MCNC: 9 MG/DL — SIGNIFICANT CHANGE UP (ref 7–23)
CALCIUM SERPL-MCNC: 8.9 MG/DL — SIGNIFICANT CHANGE UP (ref 8.4–10.5)
CHLORIDE SERPL-SCNC: 101 MMOL/L — SIGNIFICANT CHANGE UP (ref 96–108)
CO2 SERPL-SCNC: 28 MMOL/L — SIGNIFICANT CHANGE UP (ref 22–31)
CREAT SERPL-MCNC: 0.83 MG/DL — SIGNIFICANT CHANGE UP (ref 0.5–1.3)
GLUCOSE SERPL-MCNC: 99 MG/DL — SIGNIFICANT CHANGE UP (ref 70–99)
HCT VFR BLD CALC: 33.5 % — LOW (ref 34.5–45)
HGB BLD-MCNC: 10.2 G/DL — LOW (ref 11.5–15.5)
MAGNESIUM SERPL-MCNC: 1.9 MG/DL — SIGNIFICANT CHANGE UP (ref 1.6–2.6)
MCHC RBC-ENTMCNC: 28.3 PG — SIGNIFICANT CHANGE UP (ref 27–34)
MCHC RBC-ENTMCNC: 30.4 GM/DL — LOW (ref 32–36)
MCV RBC AUTO: 92.8 FL — SIGNIFICANT CHANGE UP (ref 80–100)
METHOD TYPE: SIGNIFICANT CHANGE UP
PHOSPHATE SERPL-MCNC: 2.9 MG/DL — SIGNIFICANT CHANGE UP (ref 2.5–4.5)
PLATELET # BLD AUTO: 302 K/UL — SIGNIFICANT CHANGE UP (ref 150–400)
POTASSIUM SERPL-MCNC: 4.3 MMOL/L — SIGNIFICANT CHANGE UP (ref 3.5–5.3)
POTASSIUM SERPL-SCNC: 4.3 MMOL/L — SIGNIFICANT CHANGE UP (ref 3.5–5.3)
RBC # BLD: 3.61 M/UL — LOW (ref 3.8–5.2)
RBC # FLD: 13.8 % — SIGNIFICANT CHANGE UP (ref 10.3–14.5)
SODIUM SERPL-SCNC: 138 MMOL/L — SIGNIFICANT CHANGE UP (ref 135–145)
WBC # BLD: 7.35 K/UL — SIGNIFICANT CHANGE UP (ref 3.8–10.5)
WBC # FLD AUTO: 7.35 K/UL — SIGNIFICANT CHANGE UP (ref 3.8–10.5)

## 2019-11-01 PROCEDURE — 99222 1ST HOSP IP/OBS MODERATE 55: CPT | Mod: GC

## 2019-11-01 RX ADMIN — PIPERACILLIN AND TAZOBACTAM 25 GRAM(S): 4; .5 INJECTION, POWDER, LYOPHILIZED, FOR SOLUTION INTRAVENOUS at 05:47

## 2019-11-01 RX ADMIN — ENOXAPARIN SODIUM 40 MILLIGRAM(S): 100 INJECTION SUBCUTANEOUS at 13:29

## 2019-11-01 RX ADMIN — PIPERACILLIN AND TAZOBACTAM 25 GRAM(S): 4; .5 INJECTION, POWDER, LYOPHILIZED, FOR SOLUTION INTRAVENOUS at 13:29

## 2019-11-01 RX ADMIN — LORATADINE 10 MILLIGRAM(S): 10 TABLET ORAL at 12:31

## 2019-11-01 RX ADMIN — PIPERACILLIN AND TAZOBACTAM 25 GRAM(S): 4; .5 INJECTION, POWDER, LYOPHILIZED, FOR SOLUTION INTRAVENOUS at 21:21

## 2019-11-01 NOTE — DISCHARGE NOTE PROVIDER - NSDCHHCONTRAHOME_GEN_ALL_CORE
Wound - risk of deterioration/infection/Activity restrictions due to illness/surgery/Other, specify...

## 2019-11-01 NOTE — DISCHARGE NOTE NURSING/CASE MANAGEMENT/SOCIAL WORK - NSSCTYPOFSERV_GEN_ALL_CORE
SKILLED NURSING Scheduled for  start of care  11/3/10  Skilled RN  georges and  for  VAC c ivonne.   Agency will contact you to set up time of visit. IF you have any questions  to same  feel free to contact agency.

## 2019-11-01 NOTE — DISCHARGE NOTE PROVIDER - NSDCCPCAREPLAN_GEN_ALL_CORE_FT
PRINCIPAL DISCHARGE DIAGNOSIS  Diagnosis: Abdominal wall abscess at site of surgical wound  Assessment and Plan of Treatment: Incision and drainage at bedside

## 2019-11-01 NOTE — CONSULT NOTE ADULT - ASSESSMENT
54F with Hx diverticulitis  s/p colon resection and colostomy 2006 (with subsequent reversal of colostomy). Pt has developed large incisional hernia, with c/o occasional discomfort and is now s/p incisional hernia repair with plastics reconstruction in September 2019. She had been recovering well from surgery for the last three weeks and her LLQ surgical drain was removed on 10/21. However, she has had continued drainage of dark red/brown murky fluid from the drain site and the central portion of her wound.  Reported fever of 101 at home here 100.1   WBC normal but low BP   Blood cultures sent on admission NTD  Had bedside debridement and cultures growing pansensitive Klebsiella pneumonia   She was started on Vanco and Zosyn and is doing well post debridement   Started on wound vac and patient is anxious to home     Overall, 54F with wound infection growing klebsiella     Recommendations:  -Stop Vancomycin (done)  -Continue Piperacillin/tazobactam 3.375 grams every 8 hours while inpatient  -upon discharge, start Cefdinir 300mg BID to complete at least 7 days include the IV she received inpatient  -Continue Wound vac   -Patient should be seen by her surgeon outpatient on last day of antibiotics to determine if she needs to continue     Discussed with Green team surgery    Frankie King DO  Pager [phone]   ID fellow, PGY 5  After 5pm/weekends call [phone] 54F with Hx diverticulitis  s/p colon resection and colostomy 2006 (with subsequent reversal of colostomy). Pt has developed large incisional hernia, with c/o occasional discomfort and is now s/p incisional hernia repair with plastics reconstruction in September 2019. She had been recovering well from surgery for the last three weeks and her LLQ surgical drain was removed on 10/21. However, she has had continued drainage of dark red/brown murky fluid from the drain site and the central portion of her wound.  Reported fever of 101 at home here 100.1   WBC normal but low BP   Blood cultures sent on admission NTD  Had bedside debridement and cultures growing pansensitive Klebsiella pneumonia   She was started on Vanco and Zosyn and is doing well post debridement   Started on wound vac and patient is anxious to home     Overall, 54F with post op wound infection, positive cx finding with klebsiella     Recommendations:  -Stop Vancomycin (done)  -Continue Piperacillin/tazobactam 3.375 grams every 8 hours while inpatient  -upon discharge, start Cefdinir 300mg BID to complete at least 7 days until 11/6/19.   -Continue Wound vac   -Patient should be seen by her surgeon outpatient on last day of antibiotics to determine if she needs to continue     Discussed with Green team surgery    Frankie King DO  Pager [phone]   ID fellow, PGY 5  After 5pm/weekends call [phone]

## 2019-11-01 NOTE — PROGRESS NOTE ADULT - ASSESSMENT
A/P 54F s/p incisional hernia repair now with SSI s/p bedside I&D 10/30    - Continue with daily dressing changes  - May consider VAC if amenable to general surgery  - Follow up wound cultures, prelim growing Klebsiella pneumoniae  - Continue with empiric antibiotics while awaiting cultures and sensitivities  - Appreciate general surgery care; care per primary    Plastic Surgery [phone]

## 2019-11-01 NOTE — DISCHARGE NOTE NURSING/CASE MANAGEMENT/SOCIAL WORK - PATIENT PORTAL LINK FT
You can access the FollowMyHealth Patient Portal offered by [facility] by registering at the following website: [email or web address]. By joining [employer]’s FollowMyHealth portal, you will also be able to view your health information using other applications (apps) compatible with our system.

## 2019-11-01 NOTE — DISCHARGE NOTE PROVIDER - NSDCMRMEDTOKEN_GEN_ALL_CORE_FT
multivitamin: 1 tab(s) orally once a day  Vitamin B12 1000 mcg oral tablet: 1 tab(s) orally once a day  Vitamin C 1000 mg oral tablet: 2 tab(s) orally once a day  Zinc: 15 milligram(s) orally once a day  ZyrTEC 10 mg oral tablet: 1 tab(s) orally once a day, As Needed cefdinir 300 mg oral capsule: 1 cap(s) orally 2 times a day   multivitamin: 1 tab(s) orally once a day  Vitamin B12 1000 mcg oral tablet: 1 tab(s) orally once a day  Vitamin C 1000 mg oral tablet: 2 tab(s) orally once a day  Zinc: 15 milligram(s) orally once a day  ZyrTEC 10 mg oral tablet: 1 tab(s) orally once a day, As Needed

## 2019-11-01 NOTE — DISCHARGE NOTE PROVIDER - CARE PROVIDER_API CALL
Carlos Lucas)  ColonRectal Surgery; Surgery  [address], [address]  [place] [address]  Phone: [phone]  Fax: [phone]  Follow Up Time: 2 weeks Carlos Lucas)  ColonRectal Surgery; Surgery  [address], [address]  [place] [address]  Phone: [phone]  Fax: [phone]  Follow Up Time: 1 week Carlos Lucas)  ColonRectal Surgery; Surgery  [address], [address]  [place] [address]  Phone: [phone]  Fax: [phone]  Follow Up Time: 1 week    Frankie Tse)  Plastic Surgery  [address], [address]  [place] [address]  Phone: [phone]  Fax: [phone]  Follow Up Time: 1 week Carlos Lucas)  ColonRectal Surgery; Surgery  [address], [address]  [place] [address]  Phone: [phone]  Fax: [phone]  Follow Up Time: 1-3 days    Frankie Tse)  Plastic Surgery  [address], [address]  [place] [address]  Phone: [phone]  Fax: [phone]  Follow Up Time: 1 week

## 2019-11-01 NOTE — PROGRESS NOTE ADULT - ASSESSMENT
54F roughly 1 month postop from incisional hernia repair with plastics reconstruction presenting with drainage from the surgical wound now sp a bedside I&D on 10/30    PLAN  - IV antibiotics Vancomycin and Zosyn   - F/u wound cultures from outpatient office and ED   - Surgical swab came back few GNR ovn  - Regular diet  - Pain control as needed  - Plastics aware  - Monitor recent procedure site suture if bleeding  - wound vac to be placed    Green surgery #[id number]

## 2019-11-01 NOTE — DISCHARGE NOTE PROVIDER - PROVIDER TOKENS
PROVIDER:[TOKEN:[[id number]],FOLLOWUP:[2 weeks]] PROVIDER:[TOKEN:[[id number]],FOLLOWUP:[1 week]] PROVIDER:[TOKEN:[[id number]],FOLLOWUP:[1 week]],PROVIDER:[TOKEN:[[id number]],FOLLOWUP:[1 week]] PROVIDER:[TOKEN:[[id number]],FOLLOWUP:[1-3 days]],PROVIDER:[TOKEN:[[id number]],FOLLOWUP:[1 week]]

## 2019-11-01 NOTE — PROGRESS NOTE ADULT - SUBJECTIVE AND OBJECTIVE BOX
SURGERY DAILY PROGRESS NOTE:       SUBJECTIVE/ROS: Patient examined at bedside. no acute events overnight. Pain is well controlled, tolerating diet, +/+  Denies nausea, vomiting, chest pain, shortness of breath         MEDICATIONS  (STANDING):  enoxaparin Injectable 40 milliGRAM(s) SubCutaneous every 24 hours  loratadine 10 milliGRAM(s) Oral daily  piperacillin/tazobactam IVPB.. 3.375 Gram(s) IV Intermittent every 8 hours  vancomycin  IVPB 1000 milliGRAM(s) IV Intermittent every 24 hours  vancomycin  IVPB        MEDICATIONS  (PRN):  acetaminophen   Tablet .. 650 milliGRAM(s) Oral every 6 hours PRN Mild Pain (1 - 3), Moderate Pain (4 - 6)      OBJECTIVE:    Vital Signs Last 24 Hrs  T(C): 37.4 (31 Oct 2019 22:06), Max: 37.4 (31 Oct 2019 12:45)  T(F): 99.3 (31 Oct 2019 22:06), Max: 99.4 (31 Oct 2019 12:45)  HR: 66 (31 Oct 2019 22:06) (64 - 81)  BP: 102/62 (31 Oct 2019 22:06) (91/60 - 102/62)  BP(mean): --  RR: 18 (31 Oct 2019 22:06) (18 - 18)  SpO2: 99% (31 Oct 2019 22:06) (95% - 99%)        I&O's Detail    30 Oct 2019 07:01  -  31 Oct 2019 07:00  --------------------------------------------------------  IN:    IV PiggyBack: 350 mL    Oral Fluid: 960 mL    Solution: 200 mL    Solution: 250 mL  Total IN: 1760 mL    OUT:    Voided: 650 mL  Total OUT: 650 mL    Total NET: 1110 mL      31 Oct 2019 07:01  -  01 Nov 2019 00:30  --------------------------------------------------------  IN:    Oral Fluid: 860 mL    Solution: 250 mL    Solution: 200 mL  Total IN: 1310 mL    OUT:    Voided: 300 mL  Total OUT: 300 mL    Total NET: 1010 mL          Daily     Daily     LABS:                        10.3   5.98  )-----------( 264      ( 31 Oct 2019 08:59 )             33.7     10-31    138  |  97  |  10  ----------------------------<  99  4.0   |  30  |  0.73    Ca    8.7      31 Oct 2019 06:48  Phos  3.0     10-31  Mg     2.0     10-31                        PHYSICAL EXAM:  Constitutional: well developed, well nourished, NAD  Eyes: anicteric  ENMT: normal facies, symmetric  Respiratory: Breathing comfortably    Gastrointestinal: abdomen soft, nontender, nondistended. Dressing changed wound purulent drainage  Extremities: FROM, warm  Neurological: intact, non-focal  Psychiatric: oriented x 3; appropriate

## 2019-11-01 NOTE — PHYSICAL THERAPY INITIAL EVALUATION ADULT - MODALITIES TREATMENT COMMENTS
Pt with LLQ abd surgical wound which is mostly granular with some darkened tissue (s/p AgNO3 stick used post debridement)

## 2019-11-01 NOTE — PHYSICAL THERAPY INITIAL EVALUATION ADULT - PERTINENT HX OF CURRENT PROBLEM, REHAB EVAL
54F with Hx diverticulitis s/p colon resection/colostomy 2006 developed large incisional hernia, s/p recent hernia repair with plastics reconstruction in 9/2019. Adm for surgical site infection with pain/dark red/brown drainage. S/p bedside I&D; VAC consult requested

## 2019-11-01 NOTE — PROGRESS NOTE ADULT - SUBJECTIVE AND OBJECTIVE BOX
S: Patient doing well, denies fevers, chills, abdominal pain. Per patient suprapubic swelling has improved.     O: Vital Signs  T(C): 36.9 (11-01 @ 05:58), Max: 37.4 (10-31 @ 12:45)  HR: 60 (11-01 @ 05:58) (60 - 81)  BP: 91/60 (11-01 @ 05:58) (90/61 - 102/62)  RR: 18 (11-01 @ 05:58) (18 - 18)  SpO2: 94% (11-01 @ 05:58) (94% - 99%)  10-31-19 @ 07:01  -  11-01-19 @ 07:00  --------------------------------------------------------  IN: 1410 mL / OUT: 300 mL / NET: 1110 mL      General: alert and oriented, NAD  Resp: airway patent, respirations unlabored  CVS: regular rate and rhythm  Abdomen: soft, nondistended, incision packed with betadine soaked gauze, erythema at wound edges improved from prior  Extremities: no edema  Skin: warm, dry, appropriate color                          10.3   5.98  )-----------( 264      ( 31 Oct 2019 08:59 )             33.7   10-31    138  |  97  |  10  ----------------------------<  99  4.0   |  30  |  0.73    Ca    8.7      31 Oct 2019 06:48  Phos  3.0     10-31  Mg     2.0     10-31

## 2019-11-01 NOTE — CONSULT NOTE ADULT - SUBJECTIVE AND OBJECTIVE BOX
Patient is a 54y old  Female who presents with a chief complaint of Surgical wound infection (2019 09:09)      HPI:  GREEN SURGERY CONSULT NOTE    HPI:   54F with Hx diverticulitis  s/p colon resection and colostomy  (with subsequent reversal of colostomy). Pt has developed large incisional hernia, with c/o occasional discomfort and is now s/p incisional hernia repair with plastics reconstruction in 2019. She had been recovering well from surgery for the last three weeks and her LLQ surgical drain was removed on 10/21. However, she has had continued drainage of dark red/brown murky fluid from the drain site and the central portion of her wound. She had not had much pain in the abdomen or from the draining portions of her surgical wounds, but on 10/24, she developed right leg pain and decided to present to [facility]. Sbe had had swelling of her RLE but that resolved yesterday. At the OSH, work up was negative for DVT, she was found to have a pocket of fluid at her incision site but she was discharged home without antibiotics. Yesterday she developed a fever to 101 and noted increased drainage from the central portion of her wound so she presented to Dr. Lucas's office this morning. She was then advised to come to the ED by Dr. Lucas. Denies nausea or vomiting. Has had some loose stools but denies diarrhea, has been tolerating PO.       PMHx: H/O ulcerative colitis  Incisional hernia  Allergic bronchitis  Diverticulitis    PSHx: History of colostomy reversal  History of colon resection  H/O  section  History of tonsillectomy    Medications (inpatient): piperacillin/tazobactam IVPB. 3.375 Gram(s) IV Intermittent Once    Medications (PRN):  Allergies: Hayfever (Rhinorrhea)  Kiwi (Rash)  No Known Drug Allergies  Pepper (Anaphylaxis)  Quinoa (Rash)  shellfish (Nausea; Rash)  (Intolerances: )  Social Hx: Denies smoking history, denies alcohol use  Family Hx: No pertinent family hx (29 Oct 2019 14:13)    Above reviewed. Patient had colostomy which was reversed. Led to ventral hernia which was repaired in 2019. Drain was removed 2 weeks ago and then the patient noticed she had progressive wound discharge and spiked a fever to 101.Went to [facility] who   ID consulted for workup and antibiotic management     PAST MEDICAL & SURGICAL HISTORY:  H/O ulcerative colitis  Incisional hernia  Allergic bronchitis: last episode 10 years ago  Diverticulitis  History of colostomy reversal: 2006  History of colon resection: 2006 - with creation of ostomy - for diverticulitis  H/O  section  History of tonsillectomy      Allergies  Hayfever (Rhinorrhea)  Kiwi (Rash)  No Known Drug Allergies  Pepper (Anaphylaxis)  Quinoa (Rash)  shellfish (Nausea; Rash)        ANTIMICROBIALS:  piperacillin/tazobactam IVPB.. 3.375 every 8 hours  vancomycin  IVPB 1000 every 24 hours  vancomycin  IVPB        MEDICATIONS  (STANDING):  piperacillin/tazobactam IVPB.   200 mL/Hr IV Intermittent (10-30-19 @ 09:17)    piperacillin/tazobactam IVPB.   200 mL/Hr IV Intermittent (10-29-19 @ 16:14)    piperacillin/tazobactam IVPB..   25 mL/Hr IV Intermittent (19 @ 05:47)   25 mL/Hr IV Intermittent (10-31-19 @ 21:43)   25 mL/Hr IV Intermittent (10-31-19 @ 14:18)   25 mL/Hr IV Intermittent (10-31-19 @ 07:36)   25 mL/Hr IV Intermittent (10-30-19 @ 23:05)   25 mL/Hr IV Intermittent (10-30-19 @ 13:10)    vancomycin  IVPB   250 mL/Hr IV Intermittent (10-29-19 @ 20:31)    vancomycin  IVPB   250 mL/Hr IV Intermittent (10-31-19 @ 20:34)   250 mL/Hr IV Intermittent (10-30-19 @ 20:16)        OTHER MEDS: MEDICATIONS  (STANDING):  acetaminophen   Tablet .. 650 every 6 hours PRN  enoxaparin Injectable 40 every 24 hours  loratadine 10 daily      SOCIAL HISTORY:     Denies smoking, etoh or drugs     FAMILY HISTORY:  Mother had CVA and diverticulitis     REVIEW OF SYSTEMS  [  ] ROS unobtainable because:    [X  ] All other systems negative except as noted below:	    Constitutional:  [ X] fever [ ] chills  [ ] weight loss  [ ] weakness  Skin:  [ ] rash [ ] phlebitis [x] wound discharge 	  Eyes: [ ] icterus [ ] pain  [ ] discharge	  ENMT: [ ] sore throat  [ ] thrush [ ] ulcers [ ] exudates  Respiratory: [ ] dyspnea [ ] hemoptysis [ ] cough [ ] sputum	  Cardiovascular:  [ ] chest pain [ ] palpitations [ ] edema	  Gastrointestinal:  [ ] nausea [ ] vomiting [ ] diarrhea [ ] constipation [ ] pain	  Genitourinary:  [ ] dysuria [ ] frequency [ ] hematuria [ ] discharge [ ] flank pain  [ ] incontinence  Musculoskeletal:  [ ] myalgias [ ] arthralgias [ ] arthritis  [ ] back pain  Neurological:  [ ] headache [ ] seizures  [ ] confusion/altered mental status  Psychiatric:  [ ] anxiety [ ] depression	  Hematology/Lymphatics:  [ ] lymphadenopathy  Endocrine:  [ ] adrenal [ ] thyroid  Allergic/Immunologic:	 [ ] transplant [ ] seasonal    Vital Signs Last 24 Hrs  T(F): 98.9 (19 @ 09:00), Max: 100.1 (10-29-19 @ 11:02)    Vital Signs Last 24 Hrs  HR: 69 (19 @ 09:00) (60 - 80)  BP: 114/78 (19 @ 09:00) (90/61 - 114/78)  RR: 18 (19 @ 09:00)  SpO2: 96% (19 @ 09:00) (94% - 99%)  Wt(kg): --    PHYSICAL EXAM:  General: non-toxic, sitting in chair   HEAD/EYES: anicteric, PERRL  ENT:  supple  Cardiovascular:   S1, S2, no murmurs   Respiratory:  clear bilaterally  GI:  soft, tender to palpation, normal bowel sounds, large midline scar as well as transverse lower abdominal surgical wound with wound vac in place   :  no CVA tenderness   Musculoskeletal:  no synovitis  Neurologic:  grossly non-focal  Skin:  no rash  Lymph: no lymphadenopathy  Psychiatric:  appropriate affect  Vascular:  no phlebitis          WBC Count: 7.35 K/uL ( @ 09:42)  WBC Count: 5.98 K/uL (10-31 @ 08:59)  WBC Count: 7.05 K/uL (10-30 @ 12:09)  WBC Count: 6.51 K/uL (10-29 @ 12:48)                            10.2   7.35  )-----------( 302      ( 2019 09:42 )             33.5           138  |  101  |  9   ----------------------------<  99  4.3   |  28  |  0.83    Ca    8.9      2019 07:01  Phos  2.9     -  Mg     1.9             Creatinine Trend: 0.83<--, 0.73<--, 0.75<--, 0.76<--        MICROBIOLOGY:    Culture - Surgical Swab (collected 10-30-19 @ 19:16)  Source: .Surgical Swab Incisional wound  Preliminary Report (10-31-19 @ 16:50):    Rare Klebsiella pneumoniae    Culture - Blood (collected 10-29-19 @ 22:13)  Source: .Blood  Preliminary Report (10-30-19 @ 23:01):    No growth to date.    Culture - Surgical Swab (collected 10-29-19 @ 21:56)  Source: .Surgical Swab  Preliminary Report (10-31-19 @ 21:10):    Few Klebsiella pneumoniae    Few Alpha hemolytic strep "Susceptibilities not performed"  Organism: Klebsiella pneumoniae (10-31-19 @ 21:08)  Organism: Klebsiella pneumoniae (10-31-19 @ 21:08)      -  Amikacin: S <=16      -  Amoxicillin/Clavulanic Acid: S <=8/4      -  Ampicillin: I 16 These ampicillin results predict results for amoxicillin      -  Ampicillin/Sulbactam: S <=4/2 Enterobacter, Citrobacter, and Serratia may develop resistance during prolonged therapy (3-4 days)      -  Aztreonam: S <=4      -  Cefazolin: S <=2 Enterobacter, Citrobacter, and Serratia may develop resistance during prolonged therapy (3-4 days)      -  Cefepime: S <=2      -  Cefoxitin: S <=8      -  Ceftazidime: S <=1      -  Ceftriaxone: S <=1 Enterobacter, Citrobacter, and Serratia may develop resistance during prolonged therapy      -  Cefuroxime: S <=4      -  Ciprofloxacin: S <=1      -  Ertapenem: S <=0.5      -  Gentamicin: S <=2      -  Levofloxacin: S <=2      -  Meropenem: S <=1      -  Piperacillin/Tazobactam: S <=8      -  Tigecycline: S <=1      -  Tobramycin: S <=2      -  Trimethoprim/Sulfamethoxazole: S <=2/38      Method Type: OPAL    Culture - Blood (collected 10-29-19 @ 18:22)  Source: .Blood  Preliminary Report (10-30-19 @ 19:01):    No growth to date.          RADIOLOGY:  < from: CT Abdomen and Pelvis w/ IV Cont (10.29.19 @ 18:14) >  IMPRESSION:     Post surgical changes in the anterior abdominal wall with phlegmon   without associated drainable abscess collection.    Markedly distended urinary bladder. Correlate for retention.    < end of copied text > Patient is a 54y old  Female who presents with a chief complaint of Surgical wound infection (2019 09:09)      HPI:  GREEN SURGERY CONSULT NOTE    HPI:   54F with Hx diverticulitis  s/p colon resection and colostomy  (with subsequent reversal of colostomy). Pt has developed large incisional hernia, with c/o occasional discomfort and is now s/p incisional hernia repair with plastics reconstruction in 2019. She had been recovering well from surgery for the last three weeks and her LLQ surgical drain was removed on 10/21. However, she has had continued drainage of dark red/brown murky fluid from the drain site and the central portion of her wound. She had not had much pain in the abdomen or from the draining portions of her surgical wounds, but on 10/24, she developed right leg pain and decided to present to [facility]. Sbe had had swelling of her RLE but that resolved yesterday. At the OSH, work up was negative for DVT, she was found to have a pocket of fluid at her incision site but she was discharged home without antibiotics. Yesterday she developed a fever to 101 and noted increased drainage from the central portion of her wound so she presented to Dr. Lucas's office this morning. She was then advised to come to the ED by Dr. Lucas. Denies nausea or vomiting. Has had some loose stools but denies diarrhea, has been tolerating PO.       PMHx: H/O ulcerative colitis  Incisional hernia  Allergic bronchitis  Diverticulitis    PSHx: History of colostomy reversal  History of colon resection  H/O  section  History of tonsillectomy    Medications (inpatient): piperacillin/tazobactam IVPB. 3.375 Gram(s) IV Intermittent Once    Medications (PRN):  Allergies: Hayfever (Rhinorrhea)  Kiwi (Rash)  No Known Drug Allergies  Pepper (Anaphylaxis)  Quinoa (Rash)  shellfish (Nausea; Rash)  (Intolerances: )  Social Hx: Denies smoking history, denies alcohol use  Family Hx: No pertinent family hx (29 Oct 2019 14:13)    Above reviewed. Patient had colostomy which was reversed. Led to ventral hernia which was repaired in 2019. Drain was removed 2 weeks ago and then the patient noticed she had progressive wound discharge and spiked a fever to 101.Went to [facility] who discharged her with f/u with surgery but since it continued to worsen she came to ED.   ID consulted for workup and antibiotic management     PAST MEDICAL & SURGICAL HISTORY:  H/O ulcerative colitis  Incisional hernia  Allergic bronchitis: last episode 10 years ago  Diverticulitis  History of colostomy reversal: 2006  History of colon resection: 2006 - with creation of ostomy - for diverticulitis  H/O  section  History of tonsillectomy      Allergies  Hayfever (Rhinorrhea)  Kiwi (Rash)  No Known Drug Allergies  Pepper (Anaphylaxis)  Quinoa (Rash)  shellfish (Nausea; Rash)        ANTIMICROBIALS:  piperacillin/tazobactam IVPB.. 3.375 every 8 hours  vancomycin  IVPB 1000 every 24 hours  vancomycin  IVPB        MEDICATIONS  (STANDING):  piperacillin/tazobactam IVPB.   200 mL/Hr IV Intermittent (10-30-19 @ 09:17)    piperacillin/tazobactam IVPB.   200 mL/Hr IV Intermittent (10-29-19 @ 16:14)    piperacillin/tazobactam IVPB..   25 mL/Hr IV Intermittent (19 @ 05:47)   25 mL/Hr IV Intermittent (10-31-19 @ 21:43)   25 mL/Hr IV Intermittent (10-31-19 @ 14:18)   25 mL/Hr IV Intermittent (10-31-19 @ 07:36)   25 mL/Hr IV Intermittent (10-30-19 @ 23:05)   25 mL/Hr IV Intermittent (10-30-19 @ 13:10)    vancomycin  IVPB   250 mL/Hr IV Intermittent (10-29-19 @ 20:31)    vancomycin  IVPB   250 mL/Hr IV Intermittent (10-31-19 @ 20:34)   250 mL/Hr IV Intermittent (10-30-19 @ 20:16)        OTHER MEDS: MEDICATIONS  (STANDING):  acetaminophen   Tablet .. 650 every 6 hours PRN  enoxaparin Injectable 40 every 24 hours  loratadine 10 daily      SOCIAL HISTORY:     Denies smoking, etoh or drugs     FAMILY HISTORY:  Mother had CVA and diverticulitis     REVIEW OF SYSTEMS  [  ] ROS unobtainable because:    [X  ] All other systems negative except as noted below:	    Constitutional:  [ X] fever [ ] chills  [ ] weight loss  [ ] weakness  Skin:  [ ] rash [ ] phlebitis [x] wound discharge 	  Eyes: [ ] icterus [ ] pain  [ ] discharge	  ENMT: [ ] sore throat  [ ] thrush [ ] ulcers [ ] exudates  Respiratory: [ ] dyspnea [ ] hemoptysis [ ] cough [ ] sputum	  Cardiovascular:  [ ] chest pain [ ] palpitations [ ] edema	  Gastrointestinal:  [ ] nausea [ ] vomiting [ ] diarrhea [ ] constipation [ ] pain	  Genitourinary:  [ ] dysuria [ ] frequency [ ] hematuria [ ] discharge [ ] flank pain  [ ] incontinence  Musculoskeletal:  [ ] myalgias [ ] arthralgias [ ] arthritis  [ ] back pain  Neurological:  [ ] headache [ ] seizures  [ ] confusion/altered mental status  Psychiatric:  [ ] anxiety [ ] depression	  Hematology/Lymphatics:  [ ] lymphadenopathy  Endocrine:  [ ] adrenal [ ] thyroid  Allergic/Immunologic:	 [ ] transplant [ ] seasonal    Vital Signs Last 24 Hrs  T(F): 98.9 (19 @ 09:00), Max: 100.1 (10-29-19 @ 11:02)    Vital Signs Last 24 Hrs  HR: 69 (19 @ 09:00) (60 - 80)  BP: 114/78 (19 @ 09:00) (90/61 - 114/78)  RR: 18 (19 @ 09:00)  SpO2: 96% (19 @ 09:00) (94% - 99%)  Wt(kg): --    PHYSICAL EXAM:  General: non-toxic, sitting in chair   HEAD/EYES: anicteric, PERRL  ENT:  supple  Cardiovascular:   S1, S2, no murmurs   Respiratory:  clear bilaterally  GI:  soft, tender to palpation, normal bowel sounds, large midline scar as well as transverse lower abdominal surgical wound with wound vac in place   :  no CVA tenderness   Musculoskeletal:  no synovitis  Neurologic:  grossly non-focal  Skin:  no rash  Lymph: no lymphadenopathy  Psychiatric:  appropriate affect  Vascular:  no phlebitis          WBC Count: 7.35 K/uL ( @ 09:42)  WBC Count: 5.98 K/uL (10-31 @ 08:59)  WBC Count: 7.05 K/uL (10-30 @ 12:09)  WBC Count: 6.51 K/uL (10-29 @ 12:48)                            10.2   7.35  )-----------( 302      ( 2019 09:42 )             33.5           138  |  101  |  9   ----------------------------<  99  4.3   |  28  |  0.83    Ca    8.9      2019 07:01  Phos  2.9       Mg     1.9             Creatinine Trend: 0.83<--, 0.73<--, 0.75<--, 0.76<--        MICROBIOLOGY:    Culture - Surgical Swab (collected 10-30-19 @ 19:16)  Source: .Surgical Swab Incisional wound  Preliminary Report (10-31-19 @ 16:50):    Rare Klebsiella pneumoniae    Culture - Blood (collected 10-29-19 @ 22:13)  Source: .Blood  Preliminary Report (10-30-19 @ 23:01):    No growth to date.    Culture - Surgical Swab (collected 10-29-19 @ 21:56)  Source: .Surgical Swab  Preliminary Report (10-31-19 @ 21:10):    Few Klebsiella pneumoniae    Few Alpha hemolytic strep "Susceptibilities not performed"  Organism: Klebsiella pneumoniae (10-31-19 @ 21:08)  Organism: Klebsiella pneumoniae (10-31-19 @ 21:08)      -  Amikacin: S <=16      -  Amoxicillin/Clavulanic Acid: S <=8/4      -  Ampicillin: I 16 These ampicillin results predict results for amoxicillin      -  Ampicillin/Sulbactam: S <=4/2 Enterobacter, Citrobacter, and Serratia may develop resistance during prolonged therapy (3-4 days)      -  Aztreonam: S <=4      -  Cefazolin: S <=2 Enterobacter, Citrobacter, and Serratia may develop resistance during prolonged therapy (3-4 days)      -  Cefepime: S <=2      -  Cefoxitin: S <=8      -  Ceftazidime: S <=1      -  Ceftriaxone: S <=1 Enterobacter, Citrobacter, and Serratia may develop resistance during prolonged therapy      -  Cefuroxime: S <=4      -  Ciprofloxacin: S <=1      -  Ertapenem: S <=0.5      -  Gentamicin: S <=2      -  Levofloxacin: S <=2      -  Meropenem: S <=1      -  Piperacillin/Tazobactam: S <=8      -  Tigecycline: S <=1      -  Tobramycin: S <=2      -  Trimethoprim/Sulfamethoxazole: S <=2/38      Method Type: OPAL    Culture - Blood (collected 10-29-19 @ 18:22)  Source: .Blood  Preliminary Report (10-30-19 @ 19:01):    No growth to date.          RADIOLOGY:  < from: CT Abdomen and Pelvis w/ IV Cont (10.29.19 @ 18:14) >  IMPRESSION:     Post surgical changes in the anterior abdominal wall with phlegmon   without associated drainable abscess collection.    Markedly distended urinary bladder. Correlate for retention.    < end of copied text > Patient is a 54y old  Female who presents with a chief complaint of Surgical wound infection (2019 09:09)      HPI:  GREEN SURGERY CONSULT NOTE    HPI:   54F with Hx diverticulitis  s/p colon resection and colostomy  (with subsequent reversal of colostomy). Pt has developed large incisional hernia, with c/o occasional discomfort and is now s/p incisional hernia repair with plastics reconstruction in 2019. She had been recovering well from surgery for the last three weeks and her LLQ surgical drain was removed on 10/21. However, she has had continued drainage of dark red/brown murky fluid from the drain site and the central portion of her wound. She had not had much pain in the abdomen or from the draining portions of her surgical wounds, but on 10/24, she developed right leg pain and decided to present to [facility]. Sbe had had swelling of her RLE but that resolved yesterday. At the OSH, work up was negative for DVT, she was found to have a pocket of fluid at her incision site but she was discharged home without antibiotics. Yesterday she developed a fever to 101 and noted increased drainage from the central portion of her wound so she presented to Dr. Lucas's office this morning. She was then advised to come to the ED by Dr. Lucas. Denies nausea or vomiting. Has had some loose stools but denies diarrhea, has been tolerating PO.   Above reviewed:   Patient had colostomy which was reversed. Led to ventral hernia which was repaired in 2019. She had been recovering well from surgery for the last three weeks and her LLQ surgical drain was removed on 10/21. However, she has had continued drainage of dark red/brown murky fluid from the drain site and the central portion of her wound. Reported fever of 101 at home here 100.1   WBC normal but low BP. She had bedside debridement and cultures growing pansensitive Klebsiella pneumonia       PMHx: H/O ulcerative colitis  Incisional hernia  Allergic bronchitis  Diverticulitis    PSHx: History of colostomy reversal  History of colon resection  H/O  section  History of tonsillectomy            ANTIMICROBIALS:  piperacillin/tazobactam IVPB.. 3.375 every 8 hours  vancomycin  IVPB 1000 every 24 hours  vancomycin  IVPB        MEDICATIONS  (STANDING):  piperacillin/tazobactam IVPB.   200 mL/Hr IV Intermittent (10-30-19 @ 09:17)    piperacillin/tazobactam IVPB.   200 mL/Hr IV Intermittent (10-29-19 @ 16:14)    piperacillin/tazobactam IVPB..   25 mL/Hr IV Intermittent (19 @ 05:47)   25 mL/Hr IV Intermittent (10-31-19 @ 21:43)   25 mL/Hr IV Intermittent (10-31-19 @ 14:18)   25 mL/Hr IV Intermittent (10-31-19 @ 07:36)   25 mL/Hr IV Intermittent (10-30-19 @ 23:05)   25 mL/Hr IV Intermittent (10-30-19 @ 13:10)    vancomycin  IVPB   250 mL/Hr IV Intermittent (10-29-19 @ 20:31)    vancomycin  IVPB   250 mL/Hr IV Intermittent (10-31-19 @ 20:34)   250 mL/Hr IV Intermittent (10-30-19 @ 20:16)        OTHER MEDS: MEDICATIONS  (STANDING):  acetaminophen   Tablet .. 650 every 6 hours PRN  enoxaparin Injectable 40 every 24 hours  loratadine 10 daily      SOCIAL HISTORY:     Denies smoking, etoh or drugs       FAMILY HISTORY:  Mother had CVA and diverticulitis       REVIEW OF SYSTEMS  [  ] ROS unobtainable because:    [X  ] All other systems negative except as noted below:	      Constitutional:  [ X] fever [ ] chills  [ ] weight loss  [ ] weakness  Skin:  [ ] rash [ ] phlebitis [x] wound discharge 	  Eyes: [ ] icterus [ ] pain  [ ] discharge	  ENMT: [ ] sore throat  [ ] thrush [ ] ulcers [ ] exudates  Respiratory: [ ] dyspnea [ ] hemoptysis [ ] cough [ ] sputum	  Cardiovascular:  [ ] chest pain [ ] palpitations [ ] edema	  Gastrointestinal:  [ ] nausea [ ] vomiting [ ] diarrhea [ ] constipation [ ] pain	  Genitourinary:  [ ] dysuria [ ] frequency [ ] hematuria [ ] discharge [ ] flank pain  [ ] incontinence  Musculoskeletal:  [ ] myalgias [ ] arthralgias [ ] arthritis  [ ] back pain  Neurological:  [ ] headache [ ] seizures  [ ] confusion/altered mental status  Psychiatric:  [ ] anxiety [ ] depression	  Endocrine:  [ ] adrenal [ ] thyroid  Allergic/Immunologic:	 [ ] transplant [ ] seasonal      Vital Signs Last 24 Hrs  T(F): 98.9 (19 @ 09:00), Max: 100.1 (10-29-19 @ 11:02)    Vital Signs Last 24 Hrs  HR: 69 (19 @ 09:00) (60 - 80)  BP: 114/78 (19 @ 09:00) (90/61 - 114/78)  RR: 18 (19 @ 09:00)  SpO2: 96% (19 @ 09:00) (94% - 99%)  Wt(kg): --      PHYSICAL EXAM:  General: non-toxic, sitting in chair   HEAD/EYES: anicteric, PERRL  ENT:  supple  Cardiovascular:   S1, S2, no murmurs   Respiratory:  clear bilaterally  GI:  soft, tender to palpation, normal bowel sounds, large midline scar as well as transverse lower abdominal surgical wound with wound vac in place   :  no CVA tenderness   Musculoskeletal:  no synovitis  Neurologic:  grossly non-focal  Skin:  no rash  Extremities: no edema.   Psychiatric:  appropriate affect  Vascular:  no phlebitis          WBC Count: 7.35 K/uL ( @ 09:42)  WBC Count: 5.98 K/uL (10-31 @ 08:59)  WBC Count: 7.05 K/uL (10-30 @ 12:09)  WBC Count: 6.51 K/uL (10-29 @ 12:48)                            10.2   7.35  )-----------( 302      ( 2019 09:42 )             33.5           138  |  101  |  9   ----------------------------<  99  4.3   |  28  |  0.83    Ca    8.9      2019 07:01  Phos  2.9       Mg     1.9             Creatinine Trend: 0.83<--, 0.73<--, 0.75<--, 0.76<--        MICROBIOLOGY:    Culture - Surgical Swab (collected 10-30-19 @ 19:16)  Source: .Surgical Swab Incisional wound  Preliminary Report (10-31-19 @ 16:50):    Rare Klebsiella pneumoniae    Culture - Blood (collected 10-29-19 @ 22:13)  Source: .Blood  Preliminary Report (10-30-19 @ 23:01):    No growth to date.    Culture - Surgical Swab (collected 10-29-19 @ 21:56)  Source: .Surgical Swab  Preliminary Report (10-31-19 @ 21:10):    Few Klebsiella pneumoniae    Few Alpha hemolytic strep "Susceptibilities not performed"  Organism: Klebsiella pneumoniae (10-31-19 @ 21:08)  Organism: Klebsiella pneumoniae (10-31-19 @ 21:08)      Culture - Blood (collected 10-29-19 @ 18:22)  Source: .Blood  Preliminary Report (10-30-19 @ 19:01):    No growth to date.          RADIOLOGY:   < from: CT Abdomen and Pelvis w/ IV Cont (10.29.19 @ 18:14) >  IMPRESSION:     Post surgical changes in the anterior abdominal wall with phlegmon   without associated drainable abscess collection.    Markedly distended urinary bladder. Correlate for retention.

## 2019-11-01 NOTE — DISCHARGE NOTE PROVIDER - CARE PROVIDERS DIRECT ADDRESSES
,[email or web address]_Unknown

## 2019-11-01 NOTE — DISCHARGE NOTE PROVIDER - HOSPITAL COURSE
54F with Hx diverticulitis  s/p colon resection and colostomy 2006 (with subsequent reversal of colostomy). Pt has developed large incisional hernia, with c/o occasional discomfort and is now s/p incisional hernia repair with plastics reconstruction in September 2019. She had been recovering well from surgery and her LLQ surgical drain was removed on 10/21. However, she has had continued drainage of dark red/brown murky fluid from the drain site and the central portion of her wound. She developed a fever to 101 and noted increased drainage from the central portion of her wound so she presented to Dr. Lucas's office and was then advised to come to the ED by Dr. Lucas. In the hospital the drainage from her wound was cultured and bedside incision and drainage performed on 10/30. Culture grew pan sensitive Klebsiella. Dressing were changed BID until 11/1 where her wound was put on a vacuum assisted closure device. Over the course of the hospital she was afebrile.        On day of discharge she was hemodynamically stable, vitals within normal limit, tolerating P.O. diet, having BM, voiding, and ambulating without issue. 54F with Hx diverticulitis  s/p colon resection and colostomy 2006 (with subsequent reversal of colostomy). Pt has developed large incisional hernia, with c/o occasional discomfort and is now s/p incisional hernia repair with plastics reconstruction in September 2019. She had been recovering well from surgery and her LLQ surgical drain was removed on 10/21. However, she has had continued drainage of dark red/brown murky fluid from the drain site and the central portion of her wound. She developed a fever to 101 and noted increased drainage from the central portion of her wound so she presented to Dr. Lucas's office and was then advised to come to the ED by Dr. Lucas. In the hospital the drainage from her wound was cultured and bedside incision and drainage performed on 10/30. Culture grew pan sensitive Klebsiella. Dressing were changed BID until 11/1 where her wound was put on a vacuum assisted closure system. Over the course of the hospital she was afebrile.        On day of discharge she was hemodynamically stable, vitals within normal limit, tolerating P.O. diet, having BM, voiding, and ambulating without issue.

## 2019-11-02 VITALS
SYSTOLIC BLOOD PRESSURE: 102 MMHG | RESPIRATION RATE: 18 BRPM | OXYGEN SATURATION: 97 % | HEART RATE: 58 BPM | TEMPERATURE: 98 F | DIASTOLIC BLOOD PRESSURE: 68 MMHG

## 2019-11-02 LAB
ANION GAP SERPL CALC-SCNC: 7 MMOL/L — SIGNIFICANT CHANGE UP (ref 5–17)
BUN SERPL-MCNC: 9 MG/DL — SIGNIFICANT CHANGE UP (ref 7–23)
CALCIUM SERPL-MCNC: 8.8 MG/DL — SIGNIFICANT CHANGE UP (ref 8.4–10.5)
CHLORIDE SERPL-SCNC: 105 MMOL/L — SIGNIFICANT CHANGE UP (ref 96–108)
CO2 SERPL-SCNC: 28 MMOL/L — SIGNIFICANT CHANGE UP (ref 22–31)
CREAT SERPL-MCNC: 0.76 MG/DL — SIGNIFICANT CHANGE UP (ref 0.5–1.3)
GLUCOSE SERPL-MCNC: 97 MG/DL — SIGNIFICANT CHANGE UP (ref 70–99)
HCT VFR BLD CALC: 33.7 % — LOW (ref 34.5–45)
HGB BLD-MCNC: 10.2 G/DL — LOW (ref 11.5–15.5)
MAGNESIUM SERPL-MCNC: 2 MG/DL — SIGNIFICANT CHANGE UP (ref 1.6–2.6)
MCHC RBC-ENTMCNC: 28 PG — SIGNIFICANT CHANGE UP (ref 27–34)
MCHC RBC-ENTMCNC: 30.3 GM/DL — LOW (ref 32–36)
MCV RBC AUTO: 92.6 FL — SIGNIFICANT CHANGE UP (ref 80–100)
PHOSPHATE SERPL-MCNC: 3.1 MG/DL — SIGNIFICANT CHANGE UP (ref 2.5–4.5)
PLATELET # BLD AUTO: 295 K/UL — SIGNIFICANT CHANGE UP (ref 150–400)
POTASSIUM SERPL-MCNC: 4.3 MMOL/L — SIGNIFICANT CHANGE UP (ref 3.5–5.3)
POTASSIUM SERPL-SCNC: 4.3 MMOL/L — SIGNIFICANT CHANGE UP (ref 3.5–5.3)
RBC # BLD: 3.64 M/UL — LOW (ref 3.8–5.2)
RBC # FLD: 13.6 % — SIGNIFICANT CHANGE UP (ref 10.3–14.5)
SODIUM SERPL-SCNC: 140 MMOL/L — SIGNIFICANT CHANGE UP (ref 135–145)
WBC # BLD: 6.33 K/UL — SIGNIFICANT CHANGE UP (ref 3.8–10.5)
WBC # FLD AUTO: 6.33 K/UL — SIGNIFICANT CHANGE UP (ref 3.8–10.5)

## 2019-11-02 PROCEDURE — 84100 ASSAY OF PHOSPHORUS: CPT

## 2019-11-02 PROCEDURE — 96374 THER/PROPH/DIAG INJ IV PUSH: CPT | Mod: XU

## 2019-11-02 PROCEDURE — 87040 BLOOD CULTURE FOR BACTERIA: CPT

## 2019-11-02 PROCEDURE — 86850 RBC ANTIBODY SCREEN: CPT

## 2019-11-02 PROCEDURE — 82435 ASSAY OF BLOOD CHLORIDE: CPT

## 2019-11-02 PROCEDURE — 87186 SC STD MICRODIL/AGAR DIL: CPT

## 2019-11-02 PROCEDURE — 86900 BLOOD TYPING SEROLOGIC ABO: CPT

## 2019-11-02 PROCEDURE — 84132 ASSAY OF SERUM POTASSIUM: CPT

## 2019-11-02 PROCEDURE — 83735 ASSAY OF MAGNESIUM: CPT

## 2019-11-02 PROCEDURE — 80048 BASIC METABOLIC PNL TOTAL CA: CPT

## 2019-11-02 PROCEDURE — 86901 BLOOD TYPING SEROLOGIC RH(D): CPT

## 2019-11-02 PROCEDURE — 85730 THROMBOPLASTIN TIME PARTIAL: CPT

## 2019-11-02 PROCEDURE — 81001 URINALYSIS AUTO W/SCOPE: CPT

## 2019-11-02 PROCEDURE — 85027 COMPLETE CBC AUTOMATED: CPT

## 2019-11-02 PROCEDURE — 85610 PROTHROMBIN TIME: CPT

## 2019-11-02 PROCEDURE — 84295 ASSAY OF SERUM SODIUM: CPT

## 2019-11-02 PROCEDURE — 83605 ASSAY OF LACTIC ACID: CPT

## 2019-11-02 PROCEDURE — 80053 COMPREHEN METABOLIC PANEL: CPT

## 2019-11-02 PROCEDURE — 82947 ASSAY GLUCOSE BLOOD QUANT: CPT

## 2019-11-02 PROCEDURE — 87075 CULTR BACTERIA EXCEPT BLOOD: CPT

## 2019-11-02 PROCEDURE — 82803 BLOOD GASES ANY COMBINATION: CPT

## 2019-11-02 PROCEDURE — 74177 CT ABD & PELVIS W/CONTRAST: CPT

## 2019-11-02 PROCEDURE — 85014 HEMATOCRIT: CPT

## 2019-11-02 PROCEDURE — 87070 CULTURE OTHR SPECIMN AEROBIC: CPT

## 2019-11-02 PROCEDURE — 82330 ASSAY OF CALCIUM: CPT

## 2019-11-02 PROCEDURE — 97162 PT EVAL MOD COMPLEX 30 MIN: CPT

## 2019-11-02 PROCEDURE — 84702 CHORIONIC GONADOTROPIN TEST: CPT

## 2019-11-02 PROCEDURE — 99024 POSTOP FOLLOW-UP VISIT: CPT

## 2019-11-02 PROCEDURE — 99285 EMERGENCY DEPT VISIT HI MDM: CPT | Mod: 25

## 2019-11-02 RX ORDER — CEFDINIR 250 MG/5ML
1 POWDER, FOR SUSPENSION ORAL
Qty: 8 | Refills: 0
Start: 2019-11-02 | End: 2019-11-05

## 2019-11-02 RX ADMIN — PIPERACILLIN AND TAZOBACTAM 25 GRAM(S): 4; .5 INJECTION, POWDER, LYOPHILIZED, FOR SOLUTION INTRAVENOUS at 05:55

## 2019-11-02 NOTE — PROGRESS NOTE ADULT - REASON FOR ADMISSION
Surgical wound infection

## 2019-11-02 NOTE — PROGRESS NOTE ADULT - SUBJECTIVE AND OBJECTIVE BOX
S: Wound vac placed by Surgery team yesterday. No acute events overnight. Patient doing well.     O: Vital Signs  T(C): 36.7 (11-02 @ 05:25), Max: 37.2 (11-01 @ 09:00)  HR: 56 (11-02 @ 05:25) (56 - 69)  BP: 94/60 (11-02 @ 05:25) (91/57 - 114/78)  RR: 18 (11-02 @ 05:25) (18 - 18)  SpO2: 97% (11-02 @ 05:25) (96% - 100%)  11-01-19 @ 07:01  -  11-02-19 @ 07:00  --------------------------------------------------------  IN: 1260 mL / OUT: 25 mL / NET: 1235 mL      General: alert and oriented, NAD  Abdomen: vac in place with good suction                            10.2   7.35  )-----------( 302      ( 01 Nov 2019 09:42 )             33.5   11-01    138  |  101  |  9   ----------------------------<  99  4.3   |  28  |  0.83    Ca    8.9      01 Nov 2019 07:01  Phos  2.9     11-01  Mg     1.9     11-01

## 2019-11-02 NOTE — PROGRESS NOTE ADULT - ASSESSMENT
54F roughly 1 month postop from incisional hernia repair with plastics reconstruction presenting with drainage from the surgical wound now sp a bedside I&D on 10/30. Now wound VAC is placed. Culture grew Pansensitive Klebsella    PLAN  - IV antibiotics Vancomycin and Zosyn   - Regular diet  - Pain control as needed  - Plastics aware  - Dispo planing      [facility] surgery #[id number]

## 2019-11-02 NOTE — PROGRESS NOTE ADULT - SUBJECTIVE AND OBJECTIVE BOX
Surgery Progress Note    S: Patient seen and examined. No acute events overnight. Reports tolerating diet without nausea, vomiting, passing flatus, having bowel movements, voiding without issues, have been ambulating and out of bed. Denies fever, chills, SOB, chest pain.     O:  Physical Exam:  Gen: Laying in bed, NAD  HEENT: atrumatic, EMOI  Resp: Unlabored breathing  Abd: soft, Nontender, nondistended, no rebound or guarding.    Ext: Moves 4 extremities spontaneously    Vital Signs Last 24 Hrs  T(C): 36.7 (02 Nov 2019 05:25), Max: 37.2 (01 Nov 2019 09:00)  T(F): 98.1 (02 Nov 2019 05:25), Max: 98.9 (01 Nov 2019 09:00)  HR: 56 (02 Nov 2019 05:25) (56 - 69)  BP: 94/60 (02 Nov 2019 05:25) (91/57 - 114/78)  BP(mean): --  RR: 18 (02 Nov 2019 05:25) (18 - 18)  SpO2: 97% (02 Nov 2019 05:25) (96% - 100%)    I&O's Detail    31 Oct 2019 07:01  -  01 Nov 2019 07:00  --------------------------------------------------------  IN:    Oral Fluid: 860 mL    Solution: 300 mL    Solution: 250 mL  Total IN: 1410 mL    OUT:    Voided: 300 mL  Total OUT: 300 mL    Total NET: 1110 mL      01 Nov 2019 07:01  -  02 Nov 2019 06:11  --------------------------------------------------------  IN:    Oral Fluid: 960 mL    Solution: 300 mL  Total IN: 1260 mL    OUT:    VAC (Vacuum Assisted Closure) System: 25 mL  Total OUT: 25 mL    Total NET: 1235 mL                                10.2   7.35  )-----------( 302      ( 01 Nov 2019 09:42 )             33.5       11-01    138  |  101  |  9   ----------------------------<  99  4.3   |  28  |  0.83    Ca    8.9      01 Nov 2019 07:01  Phos  2.9     11-01  Mg     1.9     11-01

## 2019-11-02 NOTE — PROGRESS NOTE ADULT - ATTENDING COMMENTS
I have evaluated the relevant clinical findings and plan with the surgical housestaff and/or fellow.  Agree with the above documentation with addenda as noted.       Avni Chan MD
I have seen and examined the patient. I agree with the above surgery resident's note.  vac per plastics  home on abx per ID
I have seen and examined the patient. I agree with the above surgery resident's note.  will open wound at bedside
I have seen and examined the patient. I agree with the above surgery resident's note.  plastics eval for possible wound vac  id eval for abx

## 2019-11-02 NOTE — PROGRESS NOTE ADULT - ASSESSMENT
A/P 54F s/p incisional hernia repair now with SSI s/p bedside I&D 10/30, now with wound vac in place. On IV antibiotics per ID.   - Wound cultures growing Klebsiella pneumoniae. Appreciate ID recs.  - Continue wound vac  - Rest of care per primary team, please page with any questions or concerns    Plastic Surgery [phone]

## 2019-11-03 LAB
CULTURE RESULTS: SIGNIFICANT CHANGE UP
ORGANISM # SPEC MICROSCOPIC CNT: SIGNIFICANT CHANGE UP
ORGANISM # SPEC MICROSCOPIC CNT: SIGNIFICANT CHANGE UP
SPECIMEN SOURCE: SIGNIFICANT CHANGE UP

## 2019-11-04 LAB
CULTURE RESULTS: SIGNIFICANT CHANGE UP
CULTURE RESULTS: SIGNIFICANT CHANGE UP
ORGANISM # SPEC MICROSCOPIC CNT: SIGNIFICANT CHANGE UP
ORGANISM # SPEC MICROSCOPIC CNT: SIGNIFICANT CHANGE UP
SPECIMEN SOURCE: SIGNIFICANT CHANGE UP

## 2019-11-08 ENCOUNTER — APPOINTMENT (OUTPATIENT)
Dept: SURGERY | Facility: CLINIC | Age: 54
End: 2019-11-08
Payer: MEDICAID

## 2019-11-08 VITALS
TEMPERATURE: 98.1 F | HEART RATE: 65 BPM | SYSTOLIC BLOOD PRESSURE: 123 MMHG | OXYGEN SATURATION: 95 % | RESPIRATION RATE: 16 BRPM | DIASTOLIC BLOOD PRESSURE: 75 MMHG

## 2019-11-08 PROCEDURE — 99024 POSTOP FOLLOW-UP VISIT: CPT

## 2019-11-08 RX ORDER — CEFDINIR 300 MG/1
300 CAPSULE ORAL
Refills: 0 | Status: DISCONTINUED | COMMUNITY
End: 2019-11-08

## 2019-11-08 NOTE — PHYSICAL EXAM
[de-identified] : Soft, nontender, VAC changed. Wound granulating and healthy. No evidence of ongoing infection.

## 2019-11-08 NOTE — CONSULT LETTER
[Dear  ___] : Dear  [unfilled], [Please see my note below.] : Please see my note below. [Consult Letter:] : I had the pleasure of evaluating your patient, [unfilled]. [Sincerely,] : Sincerely, [Consult Closing:] : Thank you very much for allowing me to participate in the care of this patient.  If you have any questions, please do not hesitate to contact me. [FreeTextEntry3] : Carlos Lucas M.D., F.A.C.S, F.A.S.C.R.S [DrZuhair  ___] : Dr. MENDOZA

## 2019-11-08 NOTE — HISTORY OF PRESENT ILLNESS
[de-identified] : Ruthann is a 55 y/o female here for post-operative visit. 9/30/19- incisional hernia repair with mesh (30 x 15 and 8 x 15 cm ProGrip) which was creation of bilateral myofascial and subcutaneous flaps, and lysis of adhesions. (Concurrent excision of excess skin and subcutaneous tissues by Dr. Tse). Pathology: consistent with hernia sac.\kaylee Had ADOLFO drain removed by Dr. Tse on 10/21/19. Her postop course has been complicated by a wound infection. SHe was sent to [place] ER on 10/29 for imaging and antibiotics. Patient finished Cefdinir 11/6. She has a wound vac in place. Visiting nurses change the vac MWF. In the office today she feels well. She denies pain or fever

## 2019-11-08 NOTE — END OF VISIT
[FreeTextEntry3] : I Annemarie Boyannabelle RN attest that I was present during the entire exam.\par

## 2019-11-08 NOTE — ASSESSMENT
[FreeTextEntry1] : In summary the patient is status post incisional hernia repair with mesh complicated by a subcutaneous wound infection. Her wound is open and is no granulating/healing by secondary intention with a wound VAC. Overall she is to do well. I will see her in one week for VAC change the

## 2019-11-15 ENCOUNTER — APPOINTMENT (OUTPATIENT)
Dept: SURGERY | Facility: CLINIC | Age: 54
End: 2019-11-15
Payer: MEDICAID

## 2019-11-15 VITALS
TEMPERATURE: 98.2 F | RESPIRATION RATE: 16 BRPM | OXYGEN SATURATION: 97 % | HEART RATE: 72 BPM | SYSTOLIC BLOOD PRESSURE: 114 MMHG | DIASTOLIC BLOOD PRESSURE: 71 MMHG

## 2019-11-15 DIAGNOSIS — B36.9 SUPERFICIAL MYCOSIS, UNSPECIFIED: ICD-10-CM

## 2019-11-15 DIAGNOSIS — L02.211 CUTANEOUS ABSCESS OF ABDOMINAL WALL: ICD-10-CM

## 2019-11-15 PROCEDURE — 99024 POSTOP FOLLOW-UP VISIT: CPT

## 2019-11-15 RX ORDER — NYSTATIN 100000 [USP'U]/G
100000 CREAM TOPICAL
Qty: 30 | Refills: 2 | Status: ACTIVE | COMMUNITY
Start: 2019-11-15 | End: 1900-01-01

## 2019-11-15 RX ORDER — NYSTATIN AND TRIAMCINOLONE ACETONIDE 100000; 1 MG/G; MG/G
100000-0.1 CREAM TOPICAL TWICE DAILY
Qty: 1 | Refills: 0 | Status: DISCONTINUED | COMMUNITY
Start: 2019-11-15 | End: 2019-11-15

## 2019-11-15 NOTE — HISTORY OF PRESENT ILLNESS
[de-identified] : Ruthann is a 53 y/o female here for post-operative visit. 9/30/19- incisional hernia repair with mesh (30 x 15 and 8 x 15 cm ProGrip) which was creation of bilateral myofascial and subcutaneous flaps, and lysis of adhesions. (Concurrent excision of excess skin and subcutaneous tissues by Dr. Tse). Pathology: consistent with hernia sac.\kaylee Had ADOLFO drain removed by Dr. Tse on 10/21/19. Her postop course has been complicated by a wound infection. SHe was sent to [place] ER on 10/29 for imaging and antibiotics. Patient finished Cefdinir 11/6. She has a wound vac in place. Visiting nurses change the vac MWF.

## 2019-11-15 NOTE — ASSESSMENT
[FreeTextEntry1] : Dr. Lucas in to see patient. Wound vac removed. Wound with red beefy bed, approx.10 cm long x 5 cm wide by 3 cm deep. Vac reapplied careful to avoid some skin breakdown on abdomen from the tape. Also noticed was a fungal rash in umbilicus. Advised to clean with H2O2 on a Q tip. Dry area carefully then apply Mycolog 2 cream BID. Home care nurse to change MWF - return to office in 1 week. Rosemarie Haase NP

## 2019-11-26 ENCOUNTER — APPOINTMENT (OUTPATIENT)
Dept: SURGERY | Facility: CLINIC | Age: 54
End: 2019-11-26
Payer: MEDICAID

## 2019-11-26 VITALS
RESPIRATION RATE: 16 BRPM | DIASTOLIC BLOOD PRESSURE: 76 MMHG | TEMPERATURE: 98.2 F | HEART RATE: 52 BPM | OXYGEN SATURATION: 100 % | SYSTOLIC BLOOD PRESSURE: 114 MMHG

## 2019-11-26 PROCEDURE — 99024 POSTOP FOLLOW-UP VISIT: CPT

## 2019-11-26 NOTE — HISTORY OF PRESENT ILLNESS
[de-identified] : Ruthann is a 53 y/o female status post incisional hernia repair with mesh (30 x 15 and 8 x 15 cm ProGrip) which was creation of bilateral myofascial and subcutaneous flaps, and lysis of adhesions on 9/30/19. (Concurrent excision of excess skin and subcutaneous tissues by Dr. Tse). Pathology: consistent with hernia sac.\kaylee Had ADOLFO drain removed by Dr. Tse on 10/21/19. Her postop course has been complicated by a wound infection. SHe was sent to [place] ER on 10/29 for imaging and antibiotics. Patient finished Cefdinir 11/6. She has a wound vac in place. Visiting nurses change the vac MWF. In the office today she feels well. She denies pain or fever.

## 2019-11-26 NOTE — ASSESSMENT
[FreeTextEntry1] : In summary the patient doing well. She no longer needs the wound VAC. She will be dry dressings daily and follow up with me in 2 weeks

## 2019-11-26 NOTE — PHYSICAL EXAM
[de-identified] : Soft, nontender, and back removed. Underlying wound is shallow, granulating, and without evidence of infection. The base of the wound was cauterized Silver nitrate and a dry sterile dressing was applied

## 2019-12-10 ENCOUNTER — APPOINTMENT (OUTPATIENT)
Dept: SURGERY | Facility: CLINIC | Age: 54
End: 2019-12-10
Payer: MEDICAID

## 2019-12-10 VITALS
RESPIRATION RATE: 16 BRPM | DIASTOLIC BLOOD PRESSURE: 77 MMHG | OXYGEN SATURATION: 96 % | HEART RATE: 55 BPM | SYSTOLIC BLOOD PRESSURE: 122 MMHG | TEMPERATURE: 98.2 F

## 2019-12-10 PROCEDURE — 99024 POSTOP FOLLOW-UP VISIT: CPT

## 2019-12-10 NOTE — PHYSICAL EXAM
[de-identified] : Incision healed except for a small area of granulation tissue. This was cauterized silver nitrate. There is no evidence of infection

## 2019-12-10 NOTE — HISTORY OF PRESENT ILLNESS
[FreeTextEntry1] : Ruthann is a 55 y/o female status post incisional hernia repair with mesh (30 x 15 and 8 x 15 cm ProGrip) which was creation of bilateral myofascial and subcutaneous flaps, and lysis of adhesions on 9/30/19. (Concurrent excision of excess skin and subcutaneous tissues by Dr. Tse). Pathology: consistent with hernia sac.\kaylee Had ADOLFO drain removed by Dr. Tse on 10/21/19. Her postop course has been complicated by a wound infection. SHe was sent to [place] ER on 10/29 for imaging and antibiotics. Patient finished Cefdinir 11/6. She had a wound vac that was removed 11/26 and has been using dry dressings since.

## 2020-01-07 ENCOUNTER — APPOINTMENT (OUTPATIENT)
Dept: SURGERY | Facility: CLINIC | Age: 55
End: 2020-01-07
Payer: MEDICAID

## 2020-01-07 VITALS
DIASTOLIC BLOOD PRESSURE: 85 MMHG | RESPIRATION RATE: 16 BRPM | OXYGEN SATURATION: 100 % | TEMPERATURE: 98.4 F | SYSTOLIC BLOOD PRESSURE: 135 MMHG | HEART RATE: 59 BPM

## 2020-01-07 VITALS — DIASTOLIC BLOOD PRESSURE: 76 MMHG | SYSTOLIC BLOOD PRESSURE: 114 MMHG

## 2020-01-07 DIAGNOSIS — Z09 ENCOUNTER FOR FOLLOW-UP EXAMINATION AFTER COMPLETED TREATMENT FOR CONDITIONS OTHER THAN MALIGNANT NEOPLASM: ICD-10-CM

## 2020-01-07 PROCEDURE — 99213 OFFICE O/P EST LOW 20 MIN: CPT

## 2020-01-07 NOTE — HISTORY OF PRESENT ILLNESS
[de-identified] : Ruthann is a 55 y/o female status post incisional hernia repair with mesh (30 x 15 and 8 x 15 cm ProGrip) which was creation of bilateral myofascial and subcutaneous flaps, and lysis of adhesions on 9/30/19. (Concurrent excision of excess skin and subcutaneous tissues by Dr. Tse). Pathology: consistent with hernia sac.\kaylee Had ADOLFO drain removed by Dr. Tse on 10/21/19. Her postop course has been complicated by a wound infection. SHe was sent to [place] [facility] on 10/29 for imaging and antibiotics. Patient finished Cefdinir 11/6. She had a wound vac that was removed 11/26 and has been using dry dressings since. In the office today she feels well denies pain or drainage

## 2020-01-07 NOTE — ASSESSMENT
[FreeTextEntry1] : In summary the patient is doing well. Her incision is nearly healed. Instructed her that she may resume normal activities as tolerated. She will followup as needed

## 2020-04-23 ENCOUNTER — APPOINTMENT (OUTPATIENT)
Dept: SURGERY | Facility: CLINIC | Age: 55
End: 2020-04-23

## 2020-12-23 NOTE — PRE-ANESTHESIA EVALUATION ADULT - MALLAMPATI CLASS
denies pain/discomfort
Class II - visualization of the soft palate, fauces, and uvula
Class III - visualization of the soft palate and the base of the uvula
